# Patient Record
Sex: FEMALE | Race: WHITE | NOT HISPANIC OR LATINO
[De-identification: names, ages, dates, MRNs, and addresses within clinical notes are randomized per-mention and may not be internally consistent; named-entity substitution may affect disease eponyms.]

---

## 2017-01-09 ENCOUNTER — APPOINTMENT (OUTPATIENT)
Dept: HUMAN REPRODUCTION | Facility: CLINIC | Age: 37
End: 2017-01-09

## 2017-01-09 ENCOUNTER — OTHER (OUTPATIENT)
Age: 37
End: 2017-01-09

## 2017-01-17 ENCOUNTER — RESULT REVIEW (OUTPATIENT)
Age: 37
End: 2017-01-17

## 2017-01-18 ENCOUNTER — APPOINTMENT (OUTPATIENT)
Dept: HUMAN REPRODUCTION | Facility: CLINIC | Age: 37
End: 2017-01-18

## 2017-02-01 ENCOUNTER — APPOINTMENT (OUTPATIENT)
Dept: HUMAN REPRODUCTION | Facility: CLINIC | Age: 37
End: 2017-02-01

## 2017-02-21 ENCOUNTER — APPOINTMENT (OUTPATIENT)
Dept: HUMAN REPRODUCTION | Facility: CLINIC | Age: 37
End: 2017-02-21

## 2017-02-27 ENCOUNTER — APPOINTMENT (OUTPATIENT)
Dept: HUMAN REPRODUCTION | Facility: CLINIC | Age: 37
End: 2017-02-27

## 2017-03-01 ENCOUNTER — APPOINTMENT (OUTPATIENT)
Dept: HUMAN REPRODUCTION | Facility: CLINIC | Age: 37
End: 2017-03-01

## 2017-03-03 ENCOUNTER — APPOINTMENT (OUTPATIENT)
Dept: HUMAN REPRODUCTION | Facility: CLINIC | Age: 37
End: 2017-03-03

## 2017-03-05 ENCOUNTER — APPOINTMENT (OUTPATIENT)
Dept: HUMAN REPRODUCTION | Facility: CLINIC | Age: 37
End: 2017-03-05

## 2017-03-07 ENCOUNTER — APPOINTMENT (OUTPATIENT)
Dept: HUMAN REPRODUCTION | Facility: CLINIC | Age: 37
End: 2017-03-07

## 2017-03-08 ENCOUNTER — APPOINTMENT (OUTPATIENT)
Dept: HUMAN REPRODUCTION | Facility: CLINIC | Age: 37
End: 2017-03-08

## 2017-03-09 ENCOUNTER — APPOINTMENT (OUTPATIENT)
Dept: HUMAN REPRODUCTION | Facility: CLINIC | Age: 37
End: 2017-03-09

## 2017-03-10 ENCOUNTER — APPOINTMENT (OUTPATIENT)
Dept: HUMAN REPRODUCTION | Facility: CLINIC | Age: 37
End: 2017-03-10

## 2017-03-11 ENCOUNTER — APPOINTMENT (OUTPATIENT)
Dept: HUMAN REPRODUCTION | Facility: CLINIC | Age: 37
End: 2017-03-11

## 2017-03-14 ENCOUNTER — APPOINTMENT (OUTPATIENT)
Dept: HUMAN REPRODUCTION | Facility: CLINIC | Age: 37
End: 2017-03-14

## 2017-03-16 ENCOUNTER — APPOINTMENT (OUTPATIENT)
Dept: HUMAN REPRODUCTION | Facility: CLINIC | Age: 37
End: 2017-03-16

## 2017-04-03 ENCOUNTER — APPOINTMENT (OUTPATIENT)
Dept: HUMAN REPRODUCTION | Facility: CLINIC | Age: 37
End: 2017-04-03

## 2017-04-05 ENCOUNTER — RX RENEWAL (OUTPATIENT)
Age: 37
End: 2017-04-05

## 2017-04-10 ENCOUNTER — RX RENEWAL (OUTPATIENT)
Age: 37
End: 2017-04-10

## 2017-04-20 ENCOUNTER — APPOINTMENT (OUTPATIENT)
Dept: HUMAN REPRODUCTION | Facility: CLINIC | Age: 37
End: 2017-04-20

## 2017-04-20 VITALS
BODY MASS INDEX: 22.66 KG/M2 | HEART RATE: 85 BPM | HEIGHT: 66 IN | TEMPERATURE: 98 F | OXYGEN SATURATION: 100 % | WEIGHT: 141 LBS | RESPIRATION RATE: 17 BRPM | DIASTOLIC BLOOD PRESSURE: 65 MMHG | SYSTOLIC BLOOD PRESSURE: 115 MMHG

## 2017-04-28 ENCOUNTER — APPOINTMENT (OUTPATIENT)
Dept: HUMAN REPRODUCTION | Facility: CLINIC | Age: 37
End: 2017-04-28

## 2017-05-02 ENCOUNTER — OUTPATIENT (OUTPATIENT)
Dept: OUTPATIENT SERVICES | Facility: HOSPITAL | Age: 37
LOS: 1 days | End: 2017-05-02
Payer: COMMERCIAL

## 2017-05-02 ENCOUNTER — APPOINTMENT (OUTPATIENT)
Dept: HUMAN REPRODUCTION | Facility: HOSPITAL | Age: 37
End: 2017-05-02

## 2017-05-02 ENCOUNTER — RESULT REVIEW (OUTPATIENT)
Age: 37
End: 2017-05-02

## 2017-05-02 ENCOUNTER — TRANSCRIPTION ENCOUNTER (OUTPATIENT)
Age: 37
End: 2017-05-02

## 2017-05-02 VITALS
OXYGEN SATURATION: 100 % | DIASTOLIC BLOOD PRESSURE: 61 MMHG | RESPIRATION RATE: 16 BRPM | TEMPERATURE: 98 F | HEART RATE: 81 BPM | SYSTOLIC BLOOD PRESSURE: 101 MMHG

## 2017-05-02 VITALS
HEART RATE: 85 BPM | WEIGHT: 141.1 LBS | SYSTOLIC BLOOD PRESSURE: 108 MMHG | HEIGHT: 66 IN | TEMPERATURE: 98 F | DIASTOLIC BLOOD PRESSURE: 73 MMHG | OXYGEN SATURATION: 100 %

## 2017-05-02 DIAGNOSIS — O02.1 MISSED ABORTION: ICD-10-CM

## 2017-05-02 DIAGNOSIS — O02.1 MISSED ABORTION: Chronic | ICD-10-CM

## 2017-05-02 LAB
BLD GP AB SCN SERPL QL: POSITIVE — SIGNIFICANT CHANGE UP
HCT VFR BLD CALC: 34.4 % — LOW (ref 34.5–45)
HGB BLD-MCNC: 11.7 G/DL — SIGNIFICANT CHANGE UP (ref 11.5–15.5)
MCHC RBC-ENTMCNC: 29.5 PG — SIGNIFICANT CHANGE UP (ref 27–34)
MCHC RBC-ENTMCNC: 34.1 GM/DL — SIGNIFICANT CHANGE UP (ref 32–36)
MCV RBC AUTO: 86.5 FL — SIGNIFICANT CHANGE UP (ref 80–100)
PLATELET # BLD AUTO: 191 K/UL — SIGNIFICANT CHANGE UP (ref 150–400)
RBC # BLD: 3.98 M/UL — SIGNIFICANT CHANGE UP (ref 3.8–5.2)
RBC # FLD: 11.3 % — SIGNIFICANT CHANGE UP (ref 10.3–14.5)
RH IG SCN BLD-IMP: POSITIVE — SIGNIFICANT CHANGE UP
WBC # BLD: 5.8 K/UL — SIGNIFICANT CHANGE UP (ref 3.8–10.5)
WBC # FLD AUTO: 5.8 K/UL — SIGNIFICANT CHANGE UP (ref 3.8–10.5)

## 2017-05-02 PROCEDURE — 59820 CARE OF MISCARRIAGE: CPT

## 2017-05-02 PROCEDURE — 88280 CHROMOSOME KARYOTYPE STUDY: CPT

## 2017-05-02 PROCEDURE — 88305 TISSUE EXAM BY PATHOLOGIST: CPT | Mod: 26

## 2017-05-02 PROCEDURE — 86870 RBC ANTIBODY IDENTIFICATION: CPT

## 2017-05-02 PROCEDURE — 86880 COOMBS TEST DIRECT: CPT

## 2017-05-02 PROCEDURE — 88264 CHROMOSOME ANALYSIS 20-25: CPT

## 2017-05-02 PROCEDURE — 86900 BLOOD TYPING SEROLOGIC ABO: CPT

## 2017-05-02 PROCEDURE — 88291 CYTO/MOLECULAR REPORT: CPT

## 2017-05-02 PROCEDURE — 86850 RBC ANTIBODY SCREEN: CPT

## 2017-05-02 PROCEDURE — 86901 BLOOD TYPING SEROLOGIC RH(D): CPT

## 2017-05-02 PROCEDURE — 86077 PHYS BLOOD BANK SERV XMATCH: CPT

## 2017-05-02 PROCEDURE — 88233 TISSUE CULTURE SKIN/BIOPSY: CPT

## 2017-05-02 PROCEDURE — 88305 TISSUE EXAM BY PATHOLOGIST: CPT

## 2017-05-02 PROCEDURE — 85027 COMPLETE CBC AUTOMATED: CPT

## 2017-05-02 PROCEDURE — 86922 COMPATIBILITY TEST ANTIGLOB: CPT

## 2017-05-02 RX ORDER — ACETAMINOPHEN 500 MG
975 TABLET ORAL ONCE
Qty: 0 | Refills: 0 | Status: COMPLETED | OUTPATIENT
Start: 2017-05-02 | End: 2017-05-02

## 2017-05-02 RX ORDER — SODIUM CHLORIDE 9 MG/ML
1000 INJECTION INTRAMUSCULAR; INTRAVENOUS; SUBCUTANEOUS
Qty: 0 | Refills: 0 | Status: DISCONTINUED | OUTPATIENT
Start: 2017-05-02 | End: 2017-05-17

## 2017-05-02 RX ORDER — CELECOXIB 200 MG/1
200 CAPSULE ORAL ONCE
Qty: 0 | Refills: 0 | Status: DISCONTINUED | OUTPATIENT
Start: 2017-05-02 | End: 2017-05-17

## 2017-05-02 RX ORDER — LIDOCAINE HCL 20 MG/ML
0.2 VIAL (ML) INJECTION ONCE
Qty: 0 | Refills: 0 | Status: DISCONTINUED | OUTPATIENT
Start: 2017-05-02 | End: 2017-05-17

## 2017-05-02 RX ORDER — OXYCODONE HYDROCHLORIDE 5 MG/1
5 TABLET ORAL ONCE
Qty: 0 | Refills: 0 | Status: DISCONTINUED | OUTPATIENT
Start: 2017-05-02 | End: 2017-05-02

## 2017-05-02 RX ORDER — SODIUM CHLORIDE 9 MG/ML
1000 INJECTION, SOLUTION INTRAVENOUS
Qty: 0 | Refills: 0 | Status: DISCONTINUED | OUTPATIENT
Start: 2017-05-02 | End: 2017-05-17

## 2017-05-02 RX ORDER — ONDANSETRON 8 MG/1
4 TABLET, FILM COATED ORAL ONCE
Qty: 0 | Refills: 0 | Status: DISCONTINUED | OUTPATIENT
Start: 2017-05-02 | End: 2017-05-17

## 2017-05-02 RX ORDER — CELECOXIB 200 MG/1
200 CAPSULE ORAL ONCE
Qty: 0 | Refills: 0 | Status: COMPLETED | OUTPATIENT
Start: 2017-05-02 | End: 2017-05-02

## 2017-05-02 RX ORDER — SODIUM CHLORIDE 9 MG/ML
3 INJECTION INTRAMUSCULAR; INTRAVENOUS; SUBCUTANEOUS EVERY 8 HOURS
Qty: 0 | Refills: 0 | Status: DISCONTINUED | OUTPATIENT
Start: 2017-05-02 | End: 2017-05-17

## 2017-05-02 RX ADMIN — CELECOXIB 200 MILLIGRAM(S): 200 CAPSULE ORAL at 14:14

## 2017-05-02 RX ADMIN — Medication 975 MILLIGRAM(S): at 14:14

## 2017-05-02 NOTE — H&P PST ADULT - NSANTHOSAYNRD_GEN_A_CORE
No. NIMCO screening performed.  STOP BANG Legend: 0-2 = LOW Risk; 3-4 = INTERMEDIATE Risk; 5-8 = HIGH Risk

## 2017-05-02 NOTE — ASU DISCHARGE PLAN (ADULT/PEDIATRIC). - NOTIFY
GYN Fever>100.4/Bleeding that does not stop/Persistent Nausea and Vomiting Pain not relieved by Medications/Persistent Nausea and Vomiting/GYN Fever>100.4/Unable to Urinate/Inability to Tolerate Liquids or Foods/Bleeding that does not stop

## 2017-05-02 NOTE — ASU DISCHARGE PLAN (ADULT/PEDIATRIC). - MEDICATION SUMMARY - MEDICATIONS TO TAKE
I will START or STAY ON the medications listed below when I get home from the hospital:    Synthroid 175 mcg (0.175 mg) oral tablet  -- 1 tab(s) by mouth once a day  -- Indication: For home

## 2017-05-02 NOTE — H&P PST ADULT - PROBLEM SELECTOR PLAN 1
Suction curettage for missed Ab  Pt. given Pepcid at PST with sip of water  Pt. last ate at 9 pm on 5/1/17.

## 2017-05-02 NOTE — H&P PST ADULT - HISTORY OF PRESENT ILLNESS
Pt. is a 37 y.o.  WF  with LMP 3/1/17  Pt. states routine sono revealed no fetal heartbeat. She denies pain and bleeding. She is scheduled for suction curettage for missed AB.

## 2017-05-04 LAB — SURGICAL PATHOLOGY STUDY: SIGNIFICANT CHANGE UP

## 2017-05-10 ENCOUNTER — APPOINTMENT (OUTPATIENT)
Dept: HUMAN REPRODUCTION | Facility: CLINIC | Age: 37
End: 2017-05-10

## 2017-05-24 ENCOUNTER — APPOINTMENT (OUTPATIENT)
Dept: HUMAN REPRODUCTION | Facility: CLINIC | Age: 37
End: 2017-05-24

## 2017-05-25 LAB — CHROM ANALY OVERALL INTERP SPEC-IMP: SIGNIFICANT CHANGE UP

## 2017-06-30 ENCOUNTER — APPOINTMENT (OUTPATIENT)
Dept: ENDOCRINOLOGY | Facility: CLINIC | Age: 37
End: 2017-06-30

## 2017-06-30 VITALS
HEART RATE: 79 BPM | SYSTOLIC BLOOD PRESSURE: 110 MMHG | BODY MASS INDEX: 21.69 KG/M2 | DIASTOLIC BLOOD PRESSURE: 60 MMHG | OXYGEN SATURATION: 99 % | WEIGHT: 135 LBS | HEIGHT: 66 IN

## 2017-07-05 ENCOUNTER — RX RENEWAL (OUTPATIENT)
Age: 37
End: 2017-07-05

## 2017-07-05 LAB
T3FREE SERPL-MCNC: 3.15 PG/ML
T4 FREE SERPL-MCNC: 2 NG/DL
TSH SERPL-ACNC: 0.04 UIU/ML

## 2017-07-24 ENCOUNTER — APPOINTMENT (OUTPATIENT)
Dept: HUMAN REPRODUCTION | Facility: CLINIC | Age: 37
End: 2017-07-24

## 2017-09-06 ENCOUNTER — OUTPATIENT (OUTPATIENT)
Dept: OUTPATIENT SERVICES | Facility: HOSPITAL | Age: 37
LOS: 1 days | End: 2017-09-06
Payer: COMMERCIAL

## 2017-09-06 VITALS
SYSTOLIC BLOOD PRESSURE: 97 MMHG | HEART RATE: 74 BPM | WEIGHT: 138.89 LBS | OXYGEN SATURATION: 100 % | RESPIRATION RATE: 14 BRPM | TEMPERATURE: 98 F | DIASTOLIC BLOOD PRESSURE: 66 MMHG | HEIGHT: 66 IN

## 2017-09-06 DIAGNOSIS — N70.11 CHRONIC SALPINGITIS: ICD-10-CM

## 2017-09-06 DIAGNOSIS — Z01.818 ENCOUNTER FOR OTHER PREPROCEDURAL EXAMINATION: ICD-10-CM

## 2017-09-06 DIAGNOSIS — N70.91 SALPINGITIS, UNSPECIFIED: ICD-10-CM

## 2017-09-06 DIAGNOSIS — O02.1 MISSED ABORTION: Chronic | ICD-10-CM

## 2017-09-06 LAB
HCT VFR BLD CALC: 36.4 % — SIGNIFICANT CHANGE UP (ref 34.5–45)
HGB BLD-MCNC: 12.5 G/DL — SIGNIFICANT CHANGE UP (ref 11.5–15.5)
MCHC RBC-ENTMCNC: 29 PG — SIGNIFICANT CHANGE UP (ref 27–34)
MCHC RBC-ENTMCNC: 34.3 GM/DL — SIGNIFICANT CHANGE UP (ref 32–36)
MCV RBC AUTO: 84.5 FL — SIGNIFICANT CHANGE UP (ref 80–100)
PLATELET # BLD AUTO: 197 K/UL — SIGNIFICANT CHANGE UP (ref 150–400)
RBC # BLD: 4.31 M/UL — SIGNIFICANT CHANGE UP (ref 3.8–5.2)
RBC # FLD: 13.7 % — SIGNIFICANT CHANGE UP (ref 10.3–14.5)
WBC # BLD: 5.9 K/UL — SIGNIFICANT CHANGE UP (ref 3.8–10.5)
WBC # FLD AUTO: 5.9 K/UL — SIGNIFICANT CHANGE UP (ref 3.8–10.5)

## 2017-09-06 PROCEDURE — G0463: CPT

## 2017-09-06 PROCEDURE — 85027 COMPLETE CBC AUTOMATED: CPT

## 2017-09-06 RX ORDER — LIDOCAINE HCL 20 MG/ML
0.2 VIAL (ML) INJECTION ONCE
Qty: 0 | Refills: 0 | Status: DISCONTINUED | OUTPATIENT
Start: 2017-09-12 | End: 2017-09-27

## 2017-09-06 RX ORDER — ACETAMINOPHEN 500 MG
975 TABLET ORAL ONCE
Qty: 0 | Refills: 0 | Status: COMPLETED | OUTPATIENT
Start: 2017-09-12 | End: 2017-09-12

## 2017-09-06 RX ORDER — SODIUM CHLORIDE 9 MG/ML
3 INJECTION INTRAMUSCULAR; INTRAVENOUS; SUBCUTANEOUS EVERY 8 HOURS
Qty: 0 | Refills: 0 | Status: DISCONTINUED | OUTPATIENT
Start: 2017-09-12 | End: 2017-09-27

## 2017-09-06 NOTE — H&P PST ADULT - PMH
Hypothyroidism    Missed  Female infertility    Hypothyroidism    Missed   x 2  Salpingitis  chronic

## 2017-09-06 NOTE — H&P PST ADULT - NSANTHOSAYNRD_GEN_A_CORE
neck = 12.5 inches/No. NIMCO screening performed.  STOP BANG Legend: 0-2 = LOW Risk; 3-4 = INTERMEDIATE Risk; 5-8 = HIGH Risk

## 2017-09-06 NOTE — H&P PST ADULT - HISTORY OF PRESENT ILLNESS
38 yo female with history of hypothyroidism and chronic salpingitis presents for hysteroscopy and possible right salpingectomy.    Surgical procedure type confirmed with Jeny from Dr Mondragon's booking department ([phone]) as hysteroscopy and possible right salpingectomy. 36 yo female with history of 3 miscarriages after 2 full term pregnancies.

## 2017-09-12 ENCOUNTER — OUTPATIENT (OUTPATIENT)
Dept: OUTPATIENT SERVICES | Facility: HOSPITAL | Age: 37
LOS: 1 days | End: 2017-09-12
Payer: COMMERCIAL

## 2017-09-12 ENCOUNTER — APPOINTMENT (OUTPATIENT)
Dept: HUMAN REPRODUCTION | Facility: HOSPITAL | Age: 37
End: 2017-09-12
Payer: COMMERCIAL

## 2017-09-12 ENCOUNTER — APPOINTMENT (OUTPATIENT)
Dept: HUMAN REPRODUCTION | Facility: HOSPITAL | Age: 37
End: 2017-09-12

## 2017-09-12 ENCOUNTER — RESULT REVIEW (OUTPATIENT)
Age: 37
End: 2017-09-12

## 2017-09-12 VITALS
WEIGHT: 138.89 LBS | HEIGHT: 66 IN | TEMPERATURE: 98 F | HEART RATE: 74 BPM | SYSTOLIC BLOOD PRESSURE: 97 MMHG | OXYGEN SATURATION: 100 % | DIASTOLIC BLOOD PRESSURE: 66 MMHG | RESPIRATION RATE: 14 BRPM

## 2017-09-12 VITALS
TEMPERATURE: 98 F | DIASTOLIC BLOOD PRESSURE: 70 MMHG | RESPIRATION RATE: 18 BRPM | HEART RATE: 74 BPM | SYSTOLIC BLOOD PRESSURE: 100 MMHG | OXYGEN SATURATION: 100 %

## 2017-09-12 DIAGNOSIS — O02.1 MISSED ABORTION: Chronic | ICD-10-CM

## 2017-09-12 DIAGNOSIS — N70.11 CHRONIC SALPINGITIS: ICD-10-CM

## 2017-09-12 PROCEDURE — 88305 TISSUE EXAM BY PATHOLOGIST: CPT

## 2017-09-12 PROCEDURE — 58559 HYSTEROSCOPY LYSIS: CPT

## 2017-09-12 PROCEDURE — 88305 TISSUE EXAM BY PATHOLOGIST: CPT | Mod: 26

## 2017-09-12 RX ORDER — FAMOTIDINE 10 MG/ML
1 INJECTION INTRAVENOUS
Qty: 0 | Refills: 0 | COMMUNITY

## 2017-09-12 RX ORDER — CELECOXIB 200 MG/1
200 CAPSULE ORAL ONCE
Qty: 0 | Refills: 0 | Status: COMPLETED | OUTPATIENT
Start: 2017-09-12 | End: 2017-09-12

## 2017-09-12 RX ORDER — ONDANSETRON 8 MG/1
4 TABLET, FILM COATED ORAL ONCE
Qty: 0 | Refills: 0 | Status: DISCONTINUED | OUTPATIENT
Start: 2017-09-12 | End: 2017-09-27

## 2017-09-12 RX ORDER — LEVOTHYROXINE SODIUM 125 MCG
1 TABLET ORAL
Qty: 0 | Refills: 0 | COMMUNITY

## 2017-09-12 RX ORDER — SODIUM CHLORIDE 9 MG/ML
1000 INJECTION, SOLUTION INTRAVENOUS
Qty: 0 | Refills: 0 | Status: DISCONTINUED | OUTPATIENT
Start: 2017-09-12 | End: 2017-09-27

## 2017-09-12 RX ORDER — CELECOXIB 200 MG/1
200 CAPSULE ORAL ONCE
Qty: 0 | Refills: 0 | Status: DISCONTINUED | OUTPATIENT
Start: 2017-09-12 | End: 2017-09-27

## 2017-09-12 RX ADMIN — Medication 975 MILLIGRAM(S): at 14:05

## 2017-09-12 RX ADMIN — CELECOXIB 200 MILLIGRAM(S): 200 CAPSULE ORAL at 14:06

## 2017-09-12 NOTE — ASU DISCHARGE PLAN (ADULT/PEDIATRIC). - NOTIFY
Bleeding that does not stop/GYN Fever>100.4 GYN Fever>100.4/Inability to Tolerate Liquids or Foods/Swelling that continues/Bleeding that does not stop/Persistent Nausea and Vomiting/Unable to Urinate/Pain not relieved by Medications

## 2017-09-12 NOTE — BRIEF OPERATIVE NOTE - OPERATION/FINDINGS
Significant adhesions of fundus from ostia to ostia, incised laterally in rows. Saline distention, scissors, no cautery. Small sample of EMC to pathology. Saline uptake c. 250 cc.

## 2017-09-13 LAB — SURGICAL PATHOLOGY STUDY: SIGNIFICANT CHANGE UP

## 2017-09-20 ENCOUNTER — APPOINTMENT (OUTPATIENT)
Dept: HUMAN REPRODUCTION | Facility: CLINIC | Age: 37
End: 2017-09-20
Payer: COMMERCIAL

## 2017-09-20 PROCEDURE — 99024 POSTOP FOLLOW-UP VISIT: CPT

## 2017-09-27 ENCOUNTER — APPOINTMENT (OUTPATIENT)
Dept: HUMAN REPRODUCTION | Facility: CLINIC | Age: 37
End: 2017-09-27
Payer: COMMERCIAL

## 2017-09-27 PROCEDURE — 76830 TRANSVAGINAL US NON-OB: CPT

## 2017-09-27 PROCEDURE — 99024 POSTOP FOLLOW-UP VISIT: CPT

## 2017-09-28 ENCOUNTER — TRANSCRIPTION ENCOUNTER (OUTPATIENT)
Age: 37
End: 2017-09-28

## 2017-12-09 ENCOUNTER — APPOINTMENT (OUTPATIENT)
Dept: HUMAN REPRODUCTION | Facility: CLINIC | Age: 37
End: 2017-12-09
Payer: COMMERCIAL

## 2017-12-09 PROCEDURE — 36415 COLL VENOUS BLD VENIPUNCTURE: CPT

## 2017-12-18 ENCOUNTER — APPOINTMENT (OUTPATIENT)
Dept: HUMAN REPRODUCTION | Facility: CLINIC | Age: 37
End: 2017-12-18
Payer: COMMERCIAL

## 2017-12-18 PROCEDURE — 76817 TRANSVAGINAL US OBSTETRIC: CPT

## 2017-12-18 PROCEDURE — 99213 OFFICE O/P EST LOW 20 MIN: CPT | Mod: 25

## 2017-12-18 PROCEDURE — 36415 COLL VENOUS BLD VENIPUNCTURE: CPT

## 2017-12-22 ENCOUNTER — APPOINTMENT (OUTPATIENT)
Dept: HUMAN REPRODUCTION | Facility: CLINIC | Age: 37
End: 2017-12-22
Payer: COMMERCIAL

## 2017-12-22 PROCEDURE — 99213 OFFICE O/P EST LOW 20 MIN: CPT | Mod: 25

## 2017-12-22 PROCEDURE — 76817 TRANSVAGINAL US OBSTETRIC: CPT

## 2017-12-22 PROCEDURE — 36415 COLL VENOUS BLD VENIPUNCTURE: CPT

## 2017-12-27 ENCOUNTER — APPOINTMENT (OUTPATIENT)
Dept: ENDOCRINOLOGY | Facility: CLINIC | Age: 37
End: 2017-12-27
Payer: COMMERCIAL

## 2017-12-27 VITALS
HEIGHT: 66 IN | BODY MASS INDEX: 22.5 KG/M2 | OXYGEN SATURATION: 98 % | SYSTOLIC BLOOD PRESSURE: 110 MMHG | HEART RATE: 68 BPM | WEIGHT: 140 LBS | DIASTOLIC BLOOD PRESSURE: 64 MMHG

## 2017-12-27 PROCEDURE — 99213 OFFICE O/P EST LOW 20 MIN: CPT

## 2018-01-03 ENCOUNTER — APPOINTMENT (OUTPATIENT)
Dept: HUMAN REPRODUCTION | Facility: CLINIC | Age: 38
End: 2018-01-03
Payer: COMMERCIAL

## 2018-01-03 PROCEDURE — 99213 OFFICE O/P EST LOW 20 MIN: CPT | Mod: 25

## 2018-01-03 PROCEDURE — 76817 TRANSVAGINAL US OBSTETRIC: CPT

## 2018-01-08 ENCOUNTER — APPOINTMENT (OUTPATIENT)
Dept: HUMAN REPRODUCTION | Facility: CLINIC | Age: 38
End: 2018-01-08
Payer: COMMERCIAL

## 2018-01-08 PROCEDURE — 76817 TRANSVAGINAL US OBSTETRIC: CPT

## 2018-01-08 PROCEDURE — 99213 OFFICE O/P EST LOW 20 MIN: CPT | Mod: 25

## 2018-01-12 ENCOUNTER — APPOINTMENT (OUTPATIENT)
Dept: HUMAN REPRODUCTION | Facility: CLINIC | Age: 38
End: 2018-01-12
Payer: COMMERCIAL

## 2018-01-12 PROCEDURE — 99213 OFFICE O/P EST LOW 20 MIN: CPT | Mod: 25

## 2018-01-12 PROCEDURE — 76830 TRANSVAGINAL US NON-OB: CPT

## 2018-02-09 ENCOUNTER — APPOINTMENT (OUTPATIENT)
Dept: HUMAN REPRODUCTION | Facility: CLINIC | Age: 38
End: 2018-02-09
Payer: COMMERCIAL

## 2018-02-09 PROCEDURE — 99214 OFFICE O/P EST MOD 30 MIN: CPT

## 2018-03-09 ENCOUNTER — APPOINTMENT (OUTPATIENT)
Dept: ENDOCRINOLOGY | Facility: CLINIC | Age: 38
End: 2018-03-09

## 2018-07-02 ENCOUNTER — RX RENEWAL (OUTPATIENT)
Age: 38
End: 2018-07-02

## 2018-07-16 PROBLEM — O02.1 MISSED ABORTION: Chronic | Status: ACTIVE | Noted: 2017-05-02

## 2018-07-23 PROBLEM — N97.9 FEMALE INFERTILITY, UNSPECIFIED: Chronic | Status: ACTIVE | Noted: 2017-09-06

## 2018-07-23 PROBLEM — N70.91 SALPINGITIS, UNSPECIFIED: Chronic | Status: ACTIVE | Noted: 2017-09-06

## 2018-07-25 ENCOUNTER — APPOINTMENT (OUTPATIENT)
Dept: HUMAN REPRODUCTION | Facility: CLINIC | Age: 38
End: 2018-07-25
Payer: COMMERCIAL

## 2018-07-25 PROCEDURE — 76817 TRANSVAGINAL US OBSTETRIC: CPT

## 2018-07-25 PROCEDURE — 99213 OFFICE O/P EST LOW 20 MIN: CPT | Mod: 25

## 2018-08-02 ENCOUNTER — APPOINTMENT (OUTPATIENT)
Dept: HUMAN REPRODUCTION | Facility: CLINIC | Age: 38
End: 2018-08-02
Payer: COMMERCIAL

## 2018-08-02 PROCEDURE — 76817 TRANSVAGINAL US OBSTETRIC: CPT

## 2018-08-02 PROCEDURE — 99213 OFFICE O/P EST LOW 20 MIN: CPT | Mod: 25

## 2018-08-02 PROCEDURE — 36415 COLL VENOUS BLD VENIPUNCTURE: CPT

## 2018-08-08 ENCOUNTER — APPOINTMENT (OUTPATIENT)
Dept: HUMAN REPRODUCTION | Facility: CLINIC | Age: 38
End: 2018-08-08
Payer: COMMERCIAL

## 2018-08-08 PROCEDURE — 99213 OFFICE O/P EST LOW 20 MIN: CPT | Mod: 25

## 2018-08-08 PROCEDURE — 36415 COLL VENOUS BLD VENIPUNCTURE: CPT

## 2018-08-08 PROCEDURE — 76817 TRANSVAGINAL US OBSTETRIC: CPT

## 2019-08-22 ENCOUNTER — RX RENEWAL (OUTPATIENT)
Age: 39
End: 2019-08-22

## 2019-12-04 ENCOUNTER — RESULT REVIEW (OUTPATIENT)
Age: 39
End: 2019-12-04

## 2020-03-28 ENCOUNTER — EMERGENCY (EMERGENCY)
Facility: HOSPITAL | Age: 40
LOS: 1 days | Discharge: HOME | End: 2020-03-28
Attending: EMERGENCY MEDICINE
Payer: COMMERCIAL

## 2020-03-28 VITALS
TEMPERATURE: 101 F | HEART RATE: 104 BPM | OXYGEN SATURATION: 100 % | SYSTOLIC BLOOD PRESSURE: 116 MMHG | DIASTOLIC BLOOD PRESSURE: 70 MMHG | RESPIRATION RATE: 18 BRPM

## 2020-03-28 VITALS
WEIGHT: 134.92 LBS | HEART RATE: 110 BPM | HEIGHT: 66 IN | OXYGEN SATURATION: 100 % | TEMPERATURE: 99 F | SYSTOLIC BLOOD PRESSURE: 122 MMHG | DIASTOLIC BLOOD PRESSURE: 66 MMHG | RESPIRATION RATE: 18 BRPM

## 2020-03-28 DIAGNOSIS — O02.1 MISSED ABORTION: Chronic | ICD-10-CM

## 2020-03-28 PROCEDURE — 99283 EMERGENCY DEPT VISIT LOW MDM: CPT

## 2020-03-28 PROCEDURE — 99284 EMERGENCY DEPT VISIT MOD MDM: CPT

## 2020-03-28 PROCEDURE — 87635 SARS-COV-2 COVID-19 AMP PRB: CPT

## 2020-03-28 RX ORDER — ACETAMINOPHEN 500 MG
325 TABLET ORAL ONCE
Refills: 0 | Status: COMPLETED | OUTPATIENT
Start: 2020-03-28 | End: 2020-03-28

## 2020-03-28 RX ADMIN — Medication 325 MILLIGRAM(S): at 16:38

## 2020-03-28 NOTE — ED PROVIDER NOTE - CARE PLAN
Principal Discharge DX:	Fever  Secondary Diagnosis:	[facility]

## 2020-03-28 NOTE — ED PROVIDER NOTE - NSFOLLOWUPINSTRUCTIONS_ED_ALL_ED_FT
You were seen and evaluated for possible COVID 19 infection. Testing was performed. Please follow up with employee health for results and recommendations. We think you are safe for discharge and to follow up in a few days with your doctor by phone or in person. If you have high blood pressure or diabetes talk to your doctor about the use of ACE inhibitors while with suspected coronavirus.    You should treat fevers by taking Tylenol as needed. You can take 1gram every 8 hours for fever/aches.     You should stay hydrated and increase the amount of fluid you drink.    Read all discharge paperwork.    Return to the Emergency Department if your shortness of breath becomes worse, you become weak, fevers, confusion, or if new symptoms develop.    You should monitor your temperature and quarantine yourself away from others - particularly the elderly, ill, or immunosuppressed - for the next 14 days, please follow up with employee health and your manager for the most up to date information.        - Do not go to work, school, or public areas. Avoid using public transportation, ride-sharing, or taxis.  -Wear a mask whenever you are around other people.  -Avoid sharing personal household items. You should not share dishes, drinking glasses, cups, eating utensils, towels, or bedding with other people or pets in your home. After using these items, they should be washed thoroughly with soap and water.   - Avoid touching your eyes, nose, and mouth with your hands.  - Wash your hands often with soap and water for at least 15 to 20 seconds or clean your hands with an alcohol-based hand  that contains 60 to 95% alcohol, covering all surfaces of your hands and rubbing them together until they feel dry. Soap and water should be used preferentially if hands are visibly dirty.

## 2020-03-28 NOTE — ED PROVIDER NOTE - OBJECTIVE STATEMENT
39 y/o F with PMH Hypothyroidism presents to ED with URI symptoms for the last few days. Endorses infrequent dry cough, body aches, nausea, vomiting, fevers, SOB on exertion, chest tightness.   Tmax 99.9, last dosage of Tylenol 650mg at 2pm.   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 Family history of late onset diabetes. Denies diarrhea, urinary symptoms, recent travel, smoking history. Patient has not been tested for COVID-19 39 y/o F with PMH Hypothyroidism presents to ED with URI symptoms for the last few days. Endorses infrequent dry cough, body aches, nausea, vomiting, fevers, SOB on exertion, burning chest tightness.   Tmax 99.9, last dosage of Tylenol 650mg at 2pm.   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 Family history of late onset diabetes. Denies diarrhea, urinary symptoms, recent travel, smoking history. Patient has not been tested for COVID-19

## 2020-03-28 NOTE — ED PROVIDER NOTE - CLINICAL SUMMARY MEDICAL DECISION MAKING FREE TEXT BOX
The patient does not have high-risk features of a life-threatening URI infection (COVID or otherwise). There is no severe shortness of breath, light-headedness, or inability to perform activities of daily living that would indicate impending respiratory failure. I have had a long conversation with the patient regarding the reasons to return to the Emergency Department including but not limited to: worsening cough, shortness of breath, syncope, near-syncope, chest pain or any other concerns.  However, COVID/RVP swabs were sent given age and multiple risk factors, as well as high risk exposure to clusters or possible COVID-19 exposures.  Patient was counseled extensively on self-quarantine protocol, hand washing, covering cough, cleansing of regularly used surfaces, return precautions, and supportive care measures to be taken.

## 2020-03-28 NOTE — ED PROVIDER NOTE - PHYSICAL EXAMINATION
GEN: Well Appearing, Nontoxic, NAD  HEENT: NC/AT, Symm Facies.   CV: +S1S2, RRR w/o m/g/r  RESP: CTAB w/o w/r/r  ABD: Soft, nt/nd  EXT/MSK: No lower extremity edema or calf tenderness.  FROMx4  Neuro: Grossly intact, AOX3 with normal speech

## 2020-03-28 NOTE — ED PROVIDER NOTE - PROGRESS NOTE DETAILS
Patient swabbed for COVID 19. Patient to follow up with EHS.  ED attending to be dc home. O2 sat 100% on room air.  - Lesley Carreno PA-C This patient was seen during the time of the COVID-19 pandemic. The patient was stable for discharge and the team advised the patient that admission for further work up poses increased risks to health that do not necessarily outweigh the possible benefits of further inpatient treatment. Patient given strict return precautions including but not limited to: persistent or worsening shortness of breath, chest pain, shortness of breath at rest, shortness of breath worsening on exertion, uncontrollable fevers, weakness, inability to eat or drink, or any concerns at all.  The patient and/or family were given the opportunity to ask questions and have them answered in full. The patient and/or family are with capacity and insight into the situation, treatment, risks, benefits, alternative therapies, and understand that they can ask any further questions if needed.

## 2020-03-28 NOTE — ED PROVIDER NOTE - ATTENDING CONTRIBUTION TO CARE
Agree with above, Gume Menard MD, FACEP  The patient was serially evaluated throughout emergency department course. There was no acute deterioration up to this time in the department. Patient has demonstrated clinical improvement and is stable, feels better at this time according to emergency department team. Agree with goals/plan of emergency department care as described in this physician's electronic medical record, including diagnostics, therapeutics and consultation as clinically warranted. Will discharge home with close outpatient follow up with primary care physician/provider and specialist if necessary. The patient and/or family was educated on concerning signs and features to return to the emergency department, in layman terms, including but not limited to: nausea, vomiting, fever, chills, persistent/worsening symptoms or any concerns at all. No immediate life threatening issues present on history, clinical exam, or any diagnostic evaluation. The patient is a safe disposition home, has capacity and insight into their condition, is ambulatory in the Emergency Department with no further questions and will follow up with their doctor(s) this week. The patient and/or family were given the opportunity to ask questions and have them answered in full. The patient and/or family are with capacity and insight into the situation, treatment, risks, benefits, alternative therapies, and understand that they can ask any further questions if needed. Patient and/or family/guardian understands anticipatory guidance and was given strict return and follow up precautions. The patient and/or family/guardian has been informed, in layman terms, of all concerning signs and symptoms to return to Emergency Department, the necessity to follow up with the PMD/Clinic/follow up provided within 2-3 days was explained, and the patient and/or family/guardian reports understanding of above with capacity and insight.

## 2020-03-28 NOTE — ED PROVIDER NOTE - NS ED ROS FT
Constitutional: + fever + chills  Eyes: No visual changes  CV: +chest tightness No lower extremity edema  Resp: +SOB + cough  GI: No abd pain. + nausea + vomiting.   : No dysuria, hematuria.   MSK: +body achiness   Skin: No rash  Psych: No complaints   Neuro: No numbness or tingling. + weakness.

## 2020-03-28 NOTE — ED PROVIDER NOTE - PATIENT PORTAL LINK FT
You can access the FollowMyHealth Patient Portal offered by [facility] by registering at the following website: [email or web address]. By joining [employer]’s FollowMyHealth portal, you will also be able to view your health information using other applications (apps) compatible with our system.

## 2020-03-29 LAB — SARS-COV-2 RNA SPEC QL NAA+PROBE: DETECTED

## 2020-04-25 ENCOUNTER — MESSAGE (OUTPATIENT)
Age: 40
End: 2020-04-25

## 2020-05-05 ENCOUNTER — APPOINTMENT (OUTPATIENT)
Dept: DISASTER EMERGENCY | Facility: CLINIC | Age: 40
End: 2020-05-05

## 2020-05-07 LAB
SARS-COV-2 IGG SERPL IA-ACNC: 92.9 AU/ML
SARS-COV-2 IGG SERPL QL IA: POSITIVE

## 2020-05-08 ENCOUNTER — APPOINTMENT (OUTPATIENT)
Dept: ENDOCRINOLOGY | Facility: CLINIC | Age: 40
End: 2020-05-08
Payer: COMMERCIAL

## 2020-05-08 PROCEDURE — 99213 OFFICE O/P EST LOW 20 MIN: CPT | Mod: 95

## 2020-05-13 LAB
T4 FREE SERPL-MCNC: 1.2 NG/DL
TSH SERPL-ACNC: 4.2 UIU/ML

## 2020-05-26 NOTE — ASU PATIENT PROFILE, ADULT - NS PRO ABUSE SCREEN SUSPICION NEGLECT YN
FAMILY MEDICINE TELEPHONE OFFICE VISIT      Patient: Roscoe Cosme Date of Service:  20   : 1947 MRN: [id number]     Due to COVID-19 ACTION PLAN, the patient's office visit was converted to a phone visit.  This call was made to Roscoe Cosme to discuss  Chief Complaint   Patient presents with   • Follow-up     3 month diabetic follow up.    • Sinus Problem     Per patient may have sinus infection. Pt has been using a nasal spray but hasn't really been helping.      He is an established patient of mine.  He is in [place] and his  identity has been established.  Roscoe understands that we are limiting  office visits due to the coronavirus pandemic  and he consents to a virtual visit with  charges submitted to his insurance.  11-20 minutes were spent in this  encounter.    SUBJECTIVE:   CHIEF COMPLAINT:  Chief Complaint   Patient presents with   • Follow-up     3 month diabetic follow up.    • Sinus Problem     Per patient may have sinus infection. Pt has been using a nasal spray but hasn't really been helping.        HPI:     Roscoe states that he is sheltering at place from current COVID19 pandemic.  He and family have been healthy.   He is good on refills right now.  His blood sugars have been around  qAM for the last month. His diet is about the same. He denies weight gain.   He is checking his BP at home. Usually 160-170 systolic.   Smoking about 12 cigarettes per day.  His wife is still smoking. He recently quit for about 2 weeks.     RT knee hurting on/off for the last few weeks, worse w/ weather changes.  Painful to walk around in AM, gets better during the day. No swelling.     For several months he's had sinus congestion. He's taking Sinex OTC spray.  No fevers.  No severe pain.      Medication and allergies reconciled over the phone.    Patient Active Problem List   Diagnosis   • Balance disorder   • Benign essential hypertension   • Bilateral edema of lower extremity   • CAD S/P  percutaneous coronary angioplasty   • Cervical radiculopathy   • Coronary artery disease involving native coronary artery of native heart without angina pectoris   • Diabetic macular edema of both eyes (CMS/HCC)   • Diabetic peripheral neuropathy associated with type 2 diabetes mellitus (CMS/HCC)   • Proliferative diabetic retinopathy of both eyes associated with type 2 diabetes mellitus (CMS/HCC)   • Hypercholesterolemia   • Lower urinary tract symptoms (LUTS)   • Other specified types of non-hodgkin lymphoma, lymph nodes of multiple sites (CMS/HCC)   • Nicotine dependence   • Numbness on right side   • Occlusion and stenosis of cerebral artery   • Diabetes mellitus with renal manifestations, uncontrolled (CMS/HCC)   • Prostate cancer (CMS/HCC)   • Obstructive sleep apnea syndrome   • Status post CVA   • SLAP lesion of right shoulder   • Chronic kidney disease, stage 4 (severe) (CMS/[facility])   • Adjustment reaction with anxiety and depression   • Age-related cataract of both eyes   • Other constipation   • Olecranon bursitis of left elbow   • Encounter for screening for lung cancer   • Primary osteoarthritis of right knee   • Nasal sinus congestion       Current Outpatient Medications   Medication Sig Dispense Refill   • Misc. Devices (RUBBER BATH MAT) Misc bath mat     • Elastic Bandages & Supports (DIABETIC SOCKS LARGE) Misc diabetes  crew socks black l     • Hydrocortisone (CORTIZONE-10 DIABETICS SKIN EX) diabetic skin relief foot crea     • Triamcinolone Acetonide (NASACORT ALLERGY 24HR NA) nasacort allergy 24 hour gener     • electrolyte/PEG 3350 (GAVILYTE-G) 236 g solution GaviLyte-G 236 gram-22.74 gram-6.74 gram-5.86 gram oral solution     • levoFLOXacin (LEVAQUIN) 500 MG tablet levofloxacin 500 mg tablet     • buPROPion XL (WELLBUTRIN XL) 150 MG 24 hr tablet bupropion HCl  mg 24 hr tablet, extended release     • carvedilol (COREG) 12.5 MG tablet TAKE 1 TABLET BY MOUTH TWICE DAILY WITH MEALS 180 tablet 0    • losartan (COZAAR) 100 MG tablet TAKE 1 TABLET EVERY DAY IN THE MORNING FOR HIGH BLOOD PRESSURE 90 tablet 0   • amLODIPine (NORVASC) 10 MG tablet TAKE 1 TABLET EVERY DAY 90 tablet 0   • LANTUS 100 UNIT/ML vial solution INJECT 90 UNITS SUBCUTANEOUSLY ONCE DAILY AT NIGHT 90 mL 1   • atropine (ISOPTO ATROPINE) 1 % ophthalmic solution INSTILL 1 DROP INTO RIGHT EYE ONCE DAILY  6   • Continuous Blood Gluc Sensor (FREESTYLE TIMUR SENSOR SYSTEM) Misc Use as directed. Change sensor every 14 days 1 each 0   • Cholecalciferol (VITAMIN D) 50 mcg (2,000 units) tablet Take 1 tablet by mouth daily. 90 tablet 3   • Continuous Blood Gluc Sensor (FREESTYLE TIMUR 14 DAY SENSOR) Misc 1 each every 14 days. 6 each 3   • furosemide (LASIX) 40 MG tablet Take 1 tablet by mouth 2 times daily. 180 tablet 3   • insulin regular, human, (NOVOLIN R RELION) 100 UNIT/ML injectable solution Inject 24 Units into the skin 3 times daily (before meals). 3 vial 3   • latanoprost (XALATAN) 0.005 % ophthalmic solution INSTILL 1 DROP INTO EACH EYE ONCE DAILY AT BEDTIME  6   • ACCU-CHEK FASTCLIX LANCETS Misc Test 3x/day 100 each 3   • Insulin Syringe-Needle U-100 (BD INSULIN SYRINGE ULTRAFINE) 31G X 5/16\" 0.5 ML Misc Use 4 times daily with insulin 100 each 11   • aspirin 81 MG tablet Take 81 mg by mouth daily.     • cloNIDine (CATAPRES) 0.1 MG tablet TAKE 2 TABLETS TWICE DAILY FOR HIGH BLOOD PRESSURE 360 tablet 3   • linaclotide (LINZESS) 145 MCG capsule Take 1 capsule by mouth 2 times daily as needed for Constipation. 180 capsule 3   • metOLazone (ZAROXOLYN) 5 MG tablet TAKE 1/2 (ONE-HALF) TABLET BY MOUTH EVERY OTHER DAY 22 tablet 8   • atorvastatin (LIPITOR) 40 MG tablet TAKE 1 TABLET DAILY AT BEDTIME FOR HIGH CHOLESTEROL 90 tablet 3   • hydrALAZINE (APRESOLINE) 50 MG tablet Take 1 tablet by mouth 2 (two) times a day. 180 tablet 3   • diclofenac (VOLTAREN) 1 % gel Apply 4 g topically 4 times daily. Apply to the right knee. 300 g 0   • prednisoLONE  acetate (PRED FORTE) 1 % ophthalmic suspension      • dorzolamide-timolol (COSOPT) 22.3-6.8 MG/ML ophthalmic solution      • tamsulosin (FLOMAX) 0.4 MG Cap TAKE 1 CAPSULE BY MOUTH ONCE DAILY FOR 30 DAYS  3     No current facility-administered medications for this visit.        ALLERGIES:   Allergen Reactions   • Penicillins Other (See Comments)     Unknown        Past Surgical History:   Procedure Laterality Date   • Colonoscopy  11/06/2017    Ascedning tubular adenoma. Rectal hyperplastic polyps.       Family History   Problem Relation Age of Onset   • Alcohol Abuse Son    • Cirrhosis Son         Social History     Tobacco Use   • Smoking status: Former Smoker     Packs/day: 1.50     Years: 55.00     Pack years: 82.50     Types: Cigarettes   • Smokeless tobacco: Never Used   Substance Use Topics   • Alcohol use: Yes     Frequency: Monthly or less   • Drug use: Not Currently        Review of Systems   HENT: Positive for sinus pressure. Negative for sinus pain.    Respiratory: Negative for cough, shortness of breath and wheezing.    Cardiovascular: Positive for leg swelling. Negative for chest pain and palpitations.   Gastrointestinal: Negative for nausea and vomiting.        PATIENT REPORTED VITALS/IMAGES   Speech calm, no distress.      Physical exam deferred given telephone call    Previous information:  Wt Readings from Last 1 Encounters:   05/28/20 97.5 kg (215 lb)      BP Readings from Last 1 Encounters:   05/28/20 (!) 163/70     BMI Readings from Last 1 Encounters:   05/28/20 33.67 kg/m²          ASSESSMENT AND PLAN:   The following was ordered/assessed during today's call:   Problem List Items Addressed This Visit        Circulatory    Benign essential hypertension     Uncontrolled, increase hydralazine to 50 mg twice a day from 25 mg.  Continue home blood pressure monitoring.  Advised to quit smoking.         Relevant Medications    hydrALAZINE (APRESOLINE) 50 MG tablet    CAD S/P percutaneous coronary  angioplasty    Relevant Medications    hydrALAZINE (APRESOLINE) 50 MG tablet    Coronary artery disease involving native coronary artery of native heart without angina pectoris     Status post PCI, continue medical management.  Follow-up as scheduled with cardiology         Relevant Medications    hydrALAZINE (APRESOLINE) 50 MG tablet    Other Relevant Orders    LIPID PANEL WITH REFLEX       Urinary    Chronic kidney disease, stage 4 (severe) (CMS/[facility]) - Primary     Follow-up with nephrology, new referral generated today. Reminded to avoid NSAIDs         Relevant Orders    CBC WITH DIFFERENTIAL    COMPREHENSIVE METABOLIC PANEL       Musculoskeletal    Primary osteoarthritis of right knee     Most likely diagnosis based on symptoms.  Recommend ice.  He is taking acetaminophen as needed, and can continue to do so.  Trial of diclofenac topical 1% gel.         Relevant Medications    diclofenac (VOLTAREN) 1 % gel       Endocrine    Diabetes mellitus with renal manifestations, uncontrolled (CMS/[facility])     Recent fasting sugars have shown good control.  Come in for lab work.  Continue current medication for now.         Relevant Orders    GLYCOHEMOGLOBIN    SERVICE TO NEPHROLOGY          Routine labs or other diagnostics ordered to be done at future date when permitted/safe.    Return for as scheuduled in June..    Instructions provided as documented in the AVS.    The patient indicated understanding of the diagnosis, agreed with the plan of care and agreed to call or return with any new or worsening symptoms.    Sridhar Sanon, DO  5/28/2020                no

## 2020-06-03 ENCOUNTER — EMERGENCY (EMERGENCY)
Facility: HOSPITAL | Age: 40
LOS: 1 days | Discharge: HOME | End: 2020-06-03
Attending: EMERGENCY MEDICINE
Payer: COMMERCIAL

## 2020-06-03 VITALS
HEART RATE: 129 BPM | WEIGHT: 130.07 LBS | OXYGEN SATURATION: 100 % | DIASTOLIC BLOOD PRESSURE: 72 MMHG | SYSTOLIC BLOOD PRESSURE: 94 MMHG | TEMPERATURE: 99 F | HEIGHT: 66 IN | RESPIRATION RATE: 15 BRPM

## 2020-06-03 DIAGNOSIS — O02.1 MISSED ABORTION: Chronic | ICD-10-CM

## 2020-06-03 LAB
ALBUMIN SERPL ELPH-MCNC: 4.6 G/DL — SIGNIFICANT CHANGE UP (ref 3.3–5)
ALP SERPL-CCNC: 59 U/L — SIGNIFICANT CHANGE UP (ref 40–120)
ALT FLD-CCNC: 35 U/L — SIGNIFICANT CHANGE UP (ref 10–45)
ANION GAP SERPL CALC-SCNC: 13 MMOL/L — SIGNIFICANT CHANGE UP (ref 5–17)
APPEARANCE UR: CLEAR — SIGNIFICANT CHANGE UP
AST SERPL-CCNC: 28 U/L — SIGNIFICANT CHANGE UP (ref 10–40)
BACTERIA # UR AUTO: NEGATIVE — SIGNIFICANT CHANGE UP
BASOPHILS # BLD AUTO: 0.01 K/UL — SIGNIFICANT CHANGE UP (ref 0–0.2)
BASOPHILS NFR BLD AUTO: 0.2 % — SIGNIFICANT CHANGE UP (ref 0–2)
BILIRUB SERPL-MCNC: 0.4 MG/DL — SIGNIFICANT CHANGE UP (ref 0.2–1.2)
BILIRUB UR-MCNC: NEGATIVE — SIGNIFICANT CHANGE UP
BUN SERPL-MCNC: 10 MG/DL — SIGNIFICANT CHANGE UP (ref 7–23)
CALCIUM SERPL-MCNC: 9.4 MG/DL — SIGNIFICANT CHANGE UP (ref 8.4–10.5)
CHLORIDE SERPL-SCNC: 100 MMOL/L — SIGNIFICANT CHANGE UP (ref 96–108)
CO2 SERPL-SCNC: 22 MMOL/L — SIGNIFICANT CHANGE UP (ref 22–31)
COLOR SPEC: YELLOW — SIGNIFICANT CHANGE UP
CREAT SERPL-MCNC: 0.73 MG/DL — SIGNIFICANT CHANGE UP (ref 0.5–1.3)
DIFF PNL FLD: ABNORMAL
EOSINOPHIL # BLD AUTO: 0.02 K/UL — SIGNIFICANT CHANGE UP (ref 0–0.5)
EOSINOPHIL NFR BLD AUTO: 0.3 % — SIGNIFICANT CHANGE UP (ref 0–6)
EPI CELLS # UR: 4 /HPF — SIGNIFICANT CHANGE UP
GLUCOSE SERPL-MCNC: 103 MG/DL — HIGH (ref 70–99)
GLUCOSE UR QL: NEGATIVE — SIGNIFICANT CHANGE UP
HCT VFR BLD CALC: 41.6 % — SIGNIFICANT CHANGE UP (ref 34.5–45)
HGB BLD-MCNC: 14 G/DL — SIGNIFICANT CHANGE UP (ref 11.5–15.5)
HYALINE CASTS # UR AUTO: 0 /LPF — SIGNIFICANT CHANGE UP (ref 0–7)
IMM GRANULOCYTES NFR BLD AUTO: 0.3 % — SIGNIFICANT CHANGE UP (ref 0–1.5)
KETONES UR-MCNC: ABNORMAL
LEUKOCYTE ESTERASE UR-ACNC: NEGATIVE — SIGNIFICANT CHANGE UP
LYMPHOCYTES # BLD AUTO: 1.03 K/UL — SIGNIFICANT CHANGE UP (ref 1–3.3)
LYMPHOCYTES # BLD AUTO: 16.4 % — SIGNIFICANT CHANGE UP (ref 13–44)
MCHC RBC-ENTMCNC: 29.2 PG — SIGNIFICANT CHANGE UP (ref 27–34)
MCHC RBC-ENTMCNC: 33.7 GM/DL — SIGNIFICANT CHANGE UP (ref 32–36)
MCV RBC AUTO: 86.7 FL — SIGNIFICANT CHANGE UP (ref 80–100)
MONOCYTES # BLD AUTO: 0.56 K/UL — SIGNIFICANT CHANGE UP (ref 0–0.9)
MONOCYTES NFR BLD AUTO: 8.9 % — SIGNIFICANT CHANGE UP (ref 2–14)
NEUTROPHILS # BLD AUTO: 4.63 K/UL — SIGNIFICANT CHANGE UP (ref 1.8–7.4)
NEUTROPHILS NFR BLD AUTO: 73.9 % — SIGNIFICANT CHANGE UP (ref 43–77)
NITRITE UR-MCNC: NEGATIVE — SIGNIFICANT CHANGE UP
NRBC # BLD: 0 /100 WBCS — SIGNIFICANT CHANGE UP (ref 0–0)
PH UR: 6 — SIGNIFICANT CHANGE UP (ref 5–8)
PLATELET # BLD AUTO: 156 K/UL — SIGNIFICANT CHANGE UP (ref 150–400)
POTASSIUM SERPL-MCNC: 3.9 MMOL/L — SIGNIFICANT CHANGE UP (ref 3.5–5.3)
POTASSIUM SERPL-SCNC: 3.9 MMOL/L — SIGNIFICANT CHANGE UP (ref 3.5–5.3)
PROT SERPL-MCNC: 7.5 G/DL — SIGNIFICANT CHANGE UP (ref 6–8.3)
PROT UR-MCNC: ABNORMAL
RAPID RVP RESULT: SIGNIFICANT CHANGE UP
RBC # BLD: 4.8 M/UL — SIGNIFICANT CHANGE UP (ref 3.8–5.2)
RBC # FLD: 13.7 % — SIGNIFICANT CHANGE UP (ref 10.3–14.5)
RBC CASTS # UR COMP ASSIST: 6 /HPF — HIGH (ref 0–4)
SARS-COV-2 RNA SPEC QL NAA+PROBE: SIGNIFICANT CHANGE UP
SODIUM SERPL-SCNC: 135 MMOL/L — SIGNIFICANT CHANGE UP (ref 135–145)
SP GR SPEC: 1.03 — HIGH (ref 1.01–1.02)
UROBILINOGEN FLD QL: ABNORMAL
WBC # BLD: 6.27 K/UL — SIGNIFICANT CHANGE UP (ref 3.8–10.5)
WBC # FLD AUTO: 6.27 K/UL — SIGNIFICANT CHANGE UP (ref 3.8–10.5)
WBC UR QL: 1 /HPF — SIGNIFICANT CHANGE UP (ref 0–5)

## 2020-06-03 PROCEDURE — 71046 X-RAY EXAM CHEST 2 VIEWS: CPT | Mod: 26

## 2020-06-03 PROCEDURE — 99284 EMERGENCY DEPT VISIT MOD MDM: CPT

## 2020-06-03 RX ORDER — KETOROLAC TROMETHAMINE 30 MG/ML
15 SYRINGE (ML) INJECTION ONCE
Refills: 0 | Status: DISCONTINUED | OUTPATIENT
Start: 2020-06-03 | End: 2020-06-03

## 2020-06-03 RX ORDER — SODIUM CHLORIDE 9 MG/ML
1000 INJECTION INTRAMUSCULAR; INTRAVENOUS; SUBCUTANEOUS ONCE
Refills: 0 | Status: COMPLETED | OUTPATIENT
Start: 2020-06-03 | End: 2020-06-03

## 2020-06-03 RX ADMIN — Medication 15 MILLIGRAM(S): at 22:56

## 2020-06-03 RX ADMIN — SODIUM CHLORIDE 1000 MILLILITER(S): 9 INJECTION INTRAMUSCULAR; INTRAVENOUS; SUBCUTANEOUS at 20:49

## 2020-06-03 RX ADMIN — SODIUM CHLORIDE 1000 MILLILITER(S): 9 INJECTION INTRAMUSCULAR; INTRAVENOUS; SUBCUTANEOUS at 22:54

## 2020-06-03 NOTE — ED PROVIDER NOTE - ATTENDING CONTRIBUTION TO CARE
I saw and evaluated patient with resident. I discussed H+P and MDM with resident. I agree with the statements made by the resident unless otherwise noted.    The care of this patient was in support of the [facility] countermeasures to Covid-19.

## 2020-06-03 NOTE — ED PROVIDER NOTE - NSFOLLOWUPINSTRUCTIONS_ED_ALL_ED_FT
Follow-up with your Primary Care Physician within 24-48 hours.  Please return to the Emergency Department immediately for any new, worsening or concerning symptoms.    Copy of your lab work, imaging and/or other testing performed in the ER is attached along with your discharge paperwork.  Please take this to your Primary Care Physician for further discussion, evaluation and comparison with your prior blood work results.

## 2020-06-03 NOTE — ED PROVIDER NOTE - CLINICAL SUMMARY MEDICAL DECISION MAKING FREE TEXT BOX
40 year-old female with history of hypothyroidism presents to the Emergency Department for fever; no focal source of infection.  Plan for COVID; RVP, blood work and UA.  IVF, EKG.  Patient otherwise non-toxic; if w/u negative - plan to d/c home. 40 year-old female with history of hypothyroidism presents to the Emergency Department for fever; no focal source of infection.  Plan for COVID; RVP, blood work and UA.  IVF, EKG.  Patient otherwise non-toxic; if w/u negative - plan to d/c home.    ADI Dumont MD: PT is a 40 year-old female with history of hypothyroidism who presents to the Emergency Department for fever.  Patient reportedly had COVID infection in March and now has with positive antibodies. Pt reports having fevers for the past 3 days with TMax of 103.  Had fever PTA and took Tylenol approx. 2 hours ago.  + generalized malaise and nausea.  Works as a RN at [employer]. Pt denies: chest pain, SOB, cough, pleuritic chest pain, abdominal pain, n/v/d, back pain, neck pain, HA, neck stiffness, focal numbness or weakness, visual changes, dizziness, lightheadedness, leg pain/swelling, recent travel, recent trauma, recent immobilization, dysuria, hematuria, rash, insect or tick bites. Low suspicion for SBI at this time. No focal signs/sx on exam aside from fever. Pt is hemodynamically stable. Ddx includes, however, is not limited to: viral syndrome, uti, pna, other. Plan: basic labs, u/a, ucx, RVP, reassess for possible outpt f/u with strict return precautions

## 2020-06-03 NOTE — ED PROVIDER NOTE - PHYSICAL EXAMINATION
*Gen: NAD, AAO*3  *HEENT: NC/AT, MMM, airway patent, trachea midline  *CV: RRR, S1/S2 present, no murmurs/rubs  *Resp: no respiratory distress, LCTAB, no wheezing/rales  *Abd: non-distended, soft N/Tx4, no guarding or rigidity  *Neuro: no focal neuro deficits, moving all limbs appropriately  *Extremities: no gross deformity  *Skin: no rashes, no wounds   ~ Cristina Medina M.D.

## 2020-06-03 NOTE — ED PROVIDER NOTE - OBJECTIVE STATEMENT
40 year-old female with history of hypothyroidism presents to the Emergency Department for fever.  Patient had COVID in March with positive antibodies.  Reports having fever for the past 3 days with TMax of 103.  Had fever PTA and took Tylenol approx. 2 hours ago.  + generalized malaise and nausea.  No vomiting, chest pain, shortness of breath, abdominal pain, dysuria.  Currently menstruating; no discharge.  Works as a RN at [employer].

## 2020-06-03 NOTE — ED ADULT NURSE NOTE - NS TRANSFER PATIENT BELONGINGS
Clothing/Cell Phone/PDA (specify) Detail Level: Detailed Quality 110: Preventive Care And Screening: Influenza Immunization: Influenza Immunization not Administered for Documented Reasons.

## 2020-06-03 NOTE — ED PROVIDER NOTE - PATIENT PORTAL LINK FT
You can access the FollowMyHealth Patient Portal offered by [facility] by registering at the following website: [email or web address]. By joining [facility]’s FollowMyHealth portal, you will also be able to view your health information using other applications (apps) compatible with our system.

## 2020-06-03 NOTE — ED PROVIDER NOTE - PROGRESS NOTE DETAILS
Carol HINSON: Patient's labs and imaging w/o acute abnormalities.  Patient reports feeling well.  Patient is active.  No chest pain, shortness of breath or lightheaded.  Usual BP in the 90s systolic. Attending note (Harsha): patient labs reviewed, no acute actionable findings.  Vitals reviewed, have normalized after iv fluids (borderline hypotension, 100/60s but normal as per patient for her).  Is ambulating in department without apparent difficulty or need for assistance.  Likely viral syndrome is stable for dc with continued evaluation as outpatient through pcp.

## 2020-06-04 VITALS
HEART RATE: 72 BPM | DIASTOLIC BLOOD PRESSURE: 62 MMHG | RESPIRATION RATE: 14 BRPM | SYSTOLIC BLOOD PRESSURE: 106 MMHG | TEMPERATURE: 98 F | OXYGEN SATURATION: 100 %

## 2020-06-04 PROCEDURE — 87633 RESP VIRUS 12-25 TARGETS: CPT

## 2020-06-04 PROCEDURE — 96374 THER/PROPH/DIAG INJ IV PUSH: CPT

## 2020-06-04 PROCEDURE — 99284 EMERGENCY DEPT VISIT MOD MDM: CPT | Mod: 25

## 2020-06-04 PROCEDURE — 87798 DETECT AGENT NOS DNA AMP: CPT

## 2020-06-04 PROCEDURE — 80053 COMPREHEN METABOLIC PANEL: CPT

## 2020-06-04 PROCEDURE — 71046 X-RAY EXAM CHEST 2 VIEWS: CPT

## 2020-06-04 PROCEDURE — 81001 URINALYSIS AUTO W/SCOPE: CPT

## 2020-06-04 PROCEDURE — 85027 COMPLETE CBC AUTOMATED: CPT

## 2020-06-04 PROCEDURE — 87486 CHLMYD PNEUM DNA AMP PROBE: CPT

## 2020-06-04 PROCEDURE — 93005 ELECTROCARDIOGRAM TRACING: CPT

## 2020-06-04 PROCEDURE — 96361 HYDRATE IV INFUSION ADD-ON: CPT | Mod: XU

## 2020-06-04 PROCEDURE — 87581 M.PNEUMON DNA AMP PROBE: CPT

## 2020-06-04 RX ORDER — SODIUM CHLORIDE 9 MG/ML
1000 INJECTION INTRAMUSCULAR; INTRAVENOUS; SUBCUTANEOUS ONCE
Refills: 0 | Status: COMPLETED | OUTPATIENT
Start: 2020-06-04 | End: 2020-06-04

## 2020-06-04 RX ADMIN — SODIUM CHLORIDE 1000 MILLILITER(S): 9 INJECTION INTRAMUSCULAR; INTRAVENOUS; SUBCUTANEOUS at 02:16

## 2020-06-04 RX ADMIN — SODIUM CHLORIDE 1000 MILLILITER(S): 9 INJECTION INTRAMUSCULAR; INTRAVENOUS; SUBCUTANEOUS at 00:38

## 2020-10-12 ENCOUNTER — APPOINTMENT (OUTPATIENT)
Dept: INTERNAL MEDICINE | Facility: CLINIC | Age: 40
End: 2020-10-12
Payer: COMMERCIAL

## 2020-10-12 ENCOUNTER — LABORATORY RESULT (OUTPATIENT)
Age: 40
End: 2020-10-12

## 2020-10-12 VITALS
BODY MASS INDEX: 21.95 KG/M2 | OXYGEN SATURATION: 94 % | SYSTOLIC BLOOD PRESSURE: 112 MMHG | HEART RATE: 87 BPM | TEMPERATURE: 98.3 F | DIASTOLIC BLOOD PRESSURE: 70 MMHG | WEIGHT: 136 LBS

## 2020-10-12 DIAGNOSIS — N97.0 FEMALE INFERTILITY ASSOCIATED WITH ANOVULATION: ICD-10-CM

## 2020-10-12 DIAGNOSIS — Z87.59 PERSONAL HISTORY OF OTHER COMPLICATIONS OF PREGNANCY, CHILDBIRTH AND THE PUERPERIUM: ICD-10-CM

## 2020-10-12 DIAGNOSIS — Z80.3 FAMILY HISTORY OF MALIGNANT NEOPLASM OF BREAST: ICD-10-CM

## 2020-10-12 DIAGNOSIS — Z87.42 PERSONAL HISTORY OF OTHER DISEASES OF THE FEMALE GENITAL TRACT: ICD-10-CM

## 2020-10-12 DIAGNOSIS — Z82.69 FAMILY HISTORY OF OTHER DISEASES OF THE MUSCULOSKELETAL SYSTEM AND CONNECTIVE TISSUE: ICD-10-CM

## 2020-10-12 DIAGNOSIS — Z83.3 FAMILY HISTORY OF DIABETES MELLITUS: ICD-10-CM

## 2020-10-12 DIAGNOSIS — Z82.61 FAMILY HISTORY OF ARTHRITIS: ICD-10-CM

## 2020-10-12 PROCEDURE — G0444 DEPRESSION SCREEN ANNUAL: CPT | Mod: NC,59

## 2020-10-12 PROCEDURE — 36415 COLL VENOUS BLD VENIPUNCTURE: CPT

## 2020-10-12 PROCEDURE — 99385 PREV VISIT NEW AGE 18-39: CPT | Mod: 25

## 2020-10-12 PROCEDURE — G0442 ANNUAL ALCOHOL SCREEN 15 MIN: CPT | Mod: NC

## 2020-10-12 RX ORDER — NEEDLES, FILTER 19GX1 1/2"
22G X 1-1/2" NEEDLE, DISPOSABLE MISCELLANEOUS
Qty: 30 | Refills: 2 | Status: DISCONTINUED | COMMUNITY
Start: 2017-01-27 | End: 2020-10-12

## 2020-10-12 RX ORDER — MISOPROSTOL 200 UG/1
200 TABLET ORAL DAILY
Qty: 8 | Refills: 0 | Status: DISCONTINUED | COMMUNITY
Start: 2018-01-03 | End: 2020-10-12

## 2020-10-12 RX ORDER — NEEDLES, DISPOSABLE 25GX5/8"
27G X 1/2" NEEDLE, DISPOSABLE MISCELLANEOUS
Qty: 30 | Refills: 1 | Status: DISCONTINUED | COMMUNITY
Start: 2017-01-27 | End: 2020-10-12

## 2020-10-12 RX ORDER — FOLLITROPIN ALFA 1050 UNIT
1050 KIT SUBCUTANEOUS
Qty: 3 | Refills: 1 | Status: DISCONTINUED | COMMUNITY
Start: 2017-01-27 | End: 2020-10-12

## 2020-10-12 RX ORDER — DESOGESTREL AND ETHINYL ESTRADIOL 0.15-0.03
0.15-3 KIT ORAL DAILY
Qty: 1 | Refills: 1 | Status: DISCONTINUED | COMMUNITY
Start: 2017-01-09 | End: 2020-10-12

## 2020-10-12 RX ORDER — METHYLPREDNISOLONE 8 MG/1
8 TABLET ORAL
Qty: 12 | Refills: 0 | Status: DISCONTINUED | COMMUNITY
Start: 2017-02-07 | End: 2020-10-12

## 2020-10-12 RX ORDER — ISOPROPYL ALCOHOL 70 ML/100ML
SWAB TOPICAL
Qty: 50 | Refills: 0 | Status: DISCONTINUED | COMMUNITY
Start: 2017-01-27 | End: 2020-10-12

## 2020-10-12 RX ORDER — MENOTROPINS 75 UNIT
75 KIT SUBCUTANEOUS
Qty: 25 | Refills: 1 | Status: DISCONTINUED | COMMUNITY
Start: 2017-01-27 | End: 2020-10-12

## 2020-10-12 RX ORDER — ENOXAPARIN SODIUM 100 MG/ML
40 INJECTION SUBCUTANEOUS
Qty: 30 | Refills: 2 | Status: DISCONTINUED | COMMUNITY
Start: 2018-07-25 | End: 2020-10-12

## 2020-10-12 RX ORDER — PROGESTERONE 90 MG/1.125G
8 GEL VAGINAL DAILY
Qty: 15 | Refills: 2 | Status: DISCONTINUED | COMMUNITY
Start: 2017-02-07 | End: 2020-10-12

## 2020-10-12 RX ORDER — CHORIONIC GONADOTROPIN 10000 UNIT
10000 KIT INTRAMUSCULAR
Qty: 1 | Refills: 0 | Status: DISCONTINUED | COMMUNITY
Start: 2017-01-27 | End: 2020-10-12

## 2020-10-12 RX ORDER — GANIRELIX ACETATE 250 UG/.5ML
250 INJECTION, SOLUTION SUBCUTANEOUS
Qty: 7 | Refills: 1 | Status: DISCONTINUED | COMMUNITY
Start: 2017-03-05 | End: 2020-10-12

## 2020-10-12 RX ORDER — DOXYCYCLINE HYCLATE 100 MG/1
100 CAPSULE ORAL TWICE DAILY
Qty: 33 | Refills: 0 | Status: DISCONTINUED | COMMUNITY
Start: 2017-01-27 | End: 2020-10-12

## 2020-10-12 RX ORDER — CONTAINER,EMPTY
EACH MISCELLANEOUS
Qty: 1 | Refills: 0 | Status: DISCONTINUED | COMMUNITY
Start: 2017-01-27 | End: 2020-10-12

## 2020-10-12 RX ORDER — GANIRELIX ACETATE 250 UG/.5ML
250 INJECTION, SOLUTION SUBCUTANEOUS
Qty: 7 | Refills: 0 | Status: DISCONTINUED | COMMUNITY
Start: 2017-01-27 | End: 2020-10-12

## 2020-10-12 NOTE — HEALTH RISK ASSESSMENT
[Good] : ~his/her~  mood as  good [No] : No [No falls in past year] : Patient reported no falls in the past year [0] : 2) Feeling down, depressed, or hopeless: Not at all (0) [Patient reported PAP Smear was normal] : Patient reported PAP Smear was normal [None] : None [With Significant Other] : lives with significant other [Employed] : employed [] :  [# Of Children ___] : has [unfilled] children [] : No [de-identified] : [place] [employer] - weights  [[id number]] : 0 [Reports changes in hearing] : Reports no changes in hearing [Reports changes in vision] : Reports no changes in vision [Reports changes in dental health] : Reports no changes in dental health [PapSmearDate] : 3/2019  [FreeTextEntry2] : [facility]

## 2020-10-12 NOTE — ASSESSMENT
[FreeTextEntry1] : \par Malaise FAtigue \par family hx of SLE/ RA and Type 1 DM \par -personal 8 miscarriages \par - get DORETHA , DNA, ESR, RF\par \par hx hypothyroidism - followed by endo \par - adviced resistance training exercises \par -continue Levothyroxine at current dose \par \par HCM \par Tdap-< 10 yrs ago \par Flu vaccine- 10/7/2020 at work \par PAP- 12/2019 neg \par Mammo- fx hx Paternal Aunt Breast ca in 40;s get mammo \par

## 2020-10-14 LAB
25(OH)D3 SERPL-MCNC: 35 NG/ML
ALBUMIN SERPL ELPH-MCNC: 4.6 G/DL
ALP BLD-CCNC: 44 U/L
ALT SERPL-CCNC: 11 U/L
ANA SER IF-ACNC: NEGATIVE
ANION GAP SERPL CALC-SCNC: 13 MMOL/L
APPEARANCE: ABNORMAL
AST SERPL-CCNC: 15 U/L
BASOPHILS # BLD AUTO: 0.03 K/UL
BASOPHILS NFR BLD AUTO: 0.5 %
BILIRUB SERPL-MCNC: 0.2 MG/DL
BILIRUBIN URINE: NEGATIVE
BLOOD URINE: ABNORMAL
BUN SERPL-MCNC: 15 MG/DL
CALCIUM SERPL-MCNC: 9.4 MG/DL
CCP AB SER IA-ACNC: <8 UNITS
CHLORIDE SERPL-SCNC: 103 MMOL/L
CHOLEST SERPL-MCNC: 180 MG/DL
CHOLEST/HDLC SERPL: 2.8 RATIO
CO2 SERPL-SCNC: 24 MMOL/L
COLOR: YELLOW
CREAT SERPL-MCNC: 0.67 MG/DL
DSDNA AB SER-ACNC: <12 IU/ML
EOSINOPHIL # BLD AUTO: 0.33 K/UL
EOSINOPHIL NFR BLD AUTO: 5.1 %
ERYTHROCYTE [SEDIMENTATION RATE] IN BLOOD BY WESTERGREN METHOD: 2 MM/HR
ESTIMATED AVERAGE GLUCOSE: 100 MG/DL
GLUCOSE QUALITATIVE U: NEGATIVE
GLUCOSE SERPL-MCNC: 73 MG/DL
HBA1C MFR BLD HPLC: 5.1 %
HCT VFR BLD CALC: 41.5 %
HDLC SERPL-MCNC: 64 MG/DL
HGB BLD-MCNC: 13.3 G/DL
IMM GRANULOCYTES NFR BLD AUTO: 0.2 %
KETONES URINE: NEGATIVE
LDLC SERPL CALC-MCNC: 98 MG/DL
LEUKOCYTE ESTERASE URINE: NEGATIVE
LYMPHOCYTES # BLD AUTO: 2.74 K/UL
LYMPHOCYTES NFR BLD AUTO: 42.6 %
MAN DIFF?: NORMAL
MCHC RBC-ENTMCNC: 29.3 PG
MCHC RBC-ENTMCNC: 32 GM/DL
MCV RBC AUTO: 91.4 FL
MONOCYTES # BLD AUTO: 0.37 K/UL
MONOCYTES NFR BLD AUTO: 5.8 %
NEUTROPHILS # BLD AUTO: 2.95 K/UL
NEUTROPHILS NFR BLD AUTO: 45.8 %
NITRITE URINE: NEGATIVE
PH URINE: 6.5
PLATELET # BLD AUTO: 225 K/UL
POTASSIUM SERPL-SCNC: 4.7 MMOL/L
PROT SERPL-MCNC: 6.8 G/DL
PROTEIN URINE: NORMAL
RBC # BLD: 4.54 M/UL
RBC # FLD: 12.6 %
RF+CCP IGG SER-IMP: NEGATIVE
RHEUMATOID FACT SER QL: <10 IU/ML
SODIUM SERPL-SCNC: 141 MMOL/L
SPECIFIC GRAVITY URINE: 1.02
T3FREE SERPL-MCNC: 2.47 PG/ML
T4 FREE SERPL-MCNC: 1.5 NG/DL
TRIGL SERPL-MCNC: 87 MG/DL
TSH SERPL-ACNC: 2.19 UIU/ML
UROBILINOGEN URINE: NORMAL
VIT B12 SERPL-MCNC: 766 PG/ML
WBC # FLD AUTO: 6.43 K/UL

## 2020-11-02 ENCOUNTER — RX RENEWAL (OUTPATIENT)
Age: 40
End: 2020-11-02

## 2021-03-29 ENCOUNTER — RESULT REVIEW (OUTPATIENT)
Age: 41
End: 2021-03-29

## 2021-04-01 ENCOUNTER — APPOINTMENT (OUTPATIENT)
Dept: ULTRASOUND IMAGING | Facility: CLINIC | Age: 41
End: 2021-04-01

## 2021-04-01 ENCOUNTER — APPOINTMENT (OUTPATIENT)
Dept: MAMMOGRAPHY | Facility: CLINIC | Age: 41
End: 2021-04-01

## 2021-10-22 ENCOUNTER — APPOINTMENT (OUTPATIENT)
Dept: INTERNAL MEDICINE | Facility: CLINIC | Age: 41
End: 2021-10-22
Payer: COMMERCIAL

## 2021-10-22 VITALS
DIASTOLIC BLOOD PRESSURE: 66 MMHG | SYSTOLIC BLOOD PRESSURE: 104 MMHG | BODY MASS INDEX: 22.76 KG/M2 | TEMPERATURE: 98.1 F | OXYGEN SATURATION: 95 % | WEIGHT: 141 LBS | HEART RATE: 93 BPM

## 2021-10-22 DIAGNOSIS — Z23 ENCOUNTER FOR IMMUNIZATION: ICD-10-CM

## 2021-10-22 PROCEDURE — 90686 IIV4 VACC NO PRSV 0.5 ML IM: CPT

## 2021-10-22 PROCEDURE — G0442 ANNUAL ALCOHOL SCREEN 15 MIN: CPT

## 2021-10-22 PROCEDURE — G0008: CPT

## 2021-10-22 PROCEDURE — 99396 PREV VISIT EST AGE 40-64: CPT | Mod: 25

## 2021-10-22 PROCEDURE — 36415 COLL VENOUS BLD VENIPUNCTURE: CPT

## 2021-10-22 PROCEDURE — G0444 DEPRESSION SCREEN ANNUAL: CPT | Mod: 59

## 2021-10-22 RX ORDER — IBUPROFEN 200 MG/1
200 TABLET, FILM COATED ORAL EVERY 8 HOURS
Refills: 0 | Status: ACTIVE | COMMUNITY
Start: 2021-10-22

## 2021-10-22 RX ORDER — MULTIVITAMIN
TABLET ORAL DAILY
Refills: 0 | Status: ACTIVE | COMMUNITY
Start: 2021-10-22

## 2021-10-22 NOTE — ASSESSMENT
[FreeTextEntry1] : HX plantar Fascitis- followed by Podiatrist - doing Brace and exercises - working 12 hrs shifts on feet all day \par \par hx hypothyroidism - followed by endo \par - advised resistance training exercises \par -continue Levothyroxine at current dose \par \par HCM \par Tdap-< 10 yrs ago \par Flu vaccine- 10/22/21 \par PAP- 3/29/21  neg \par Mammo- fx hx Paternal Aunt Breast ca in 40;s get mammo - has appt for 11/2021 \par Pfizer 4/2021 completed -booster advised

## 2021-10-22 NOTE — HISTORY OF PRESENT ILLNESS
[de-identified] : This is a 41 yrs old female with PMHx of Hypothyroidism seen today for her annual check up \par parents and brother my pt \par Works as RN in [employer] \par \par HX hypothyroidism Dx 2000 at age 19yrs - currently on LT4 150mcg 3 days and 175 for 4 days\par -followed by endo \par \par HX plantar Fascitis- followed by Podiatrist - doing Brace and exercises - working 12 hrs shifts on feet all day

## 2021-10-22 NOTE — HEALTH RISK ASSESSMENT
[Good] : ~his/her~  mood as  good [No] : No [No falls in past year] : Patient reported no falls in the past year [0] : 2) Feeling down, depressed, or hopeless: Not at all (0) [With Significant Other] : lives with significant other [Employed] : employed [] :  [Fully functional (bathing, dressing, toileting, transferring, walking, feeding)] : Fully functional (bathing, dressing, toileting, transferring, walking, feeding) [Fully functional (using the telephone, shopping, preparing meals, housekeeping, doing laundry, using] : Fully functional and needs no help or supervision to perform IADLs (using the telephone, shopping, preparing meals, housekeeping, doing laundry, using transportation, managing medications and managing finances) [] : No [[id number]] : 0 [Patient reported PAP Smear was normal] : Patient reported PAP Smear was normal [Reports changes in hearing] : Reports no changes in hearing [Reports changes in vision] : Reports no changes in vision [Reports changes in dental health] : Reports no changes in dental health [PapSmearDate] : 3/29/21

## 2021-10-25 LAB
25(OH)D3 SERPL-MCNC: 29.1 NG/ML
ALBUMIN SERPL ELPH-MCNC: 4.7 G/DL
ALP BLD-CCNC: 38 U/L
ALT SERPL-CCNC: 12 U/L
ANION GAP SERPL CALC-SCNC: 12 MMOL/L
APPEARANCE: CLEAR
AST SERPL-CCNC: 17 U/L
BASOPHILS # BLD AUTO: 0.02 K/UL
BASOPHILS NFR BLD AUTO: 0.2 %
BILIRUB SERPL-MCNC: 0.3 MG/DL
BILIRUBIN URINE: NEGATIVE
BLOOD URINE: NEGATIVE
BUN SERPL-MCNC: 15 MG/DL
CALCIUM SERPL-MCNC: 9.8 MG/DL
CHLORIDE SERPL-SCNC: 104 MMOL/L
CHOLEST SERPL-MCNC: 160 MG/DL
CO2 SERPL-SCNC: 22 MMOL/L
COLOR: COLORLESS
CREAT SERPL-MCNC: 0.71 MG/DL
EOSINOPHIL # BLD AUTO: 0.08 K/UL
EOSINOPHIL NFR BLD AUTO: 1 %
ESTIMATED AVERAGE GLUCOSE: 97 MG/DL
GLUCOSE QUALITATIVE U: NEGATIVE
GLUCOSE SERPL-MCNC: 80 MG/DL
HBA1C MFR BLD HPLC: 5 %
HCT VFR BLD CALC: 38.6 %
HDLC SERPL-MCNC: 54 MG/DL
HGB BLD-MCNC: 13 G/DL
IMM GRANULOCYTES NFR BLD AUTO: 0.4 %
KETONES URINE: NEGATIVE
LDLC SERPL CALC-MCNC: 83 MG/DL
LEUKOCYTE ESTERASE URINE: NEGATIVE
LYMPHOCYTES # BLD AUTO: 2.64 K/UL
LYMPHOCYTES NFR BLD AUTO: 31.6 %
MAN DIFF?: NORMAL
MCHC RBC-ENTMCNC: 30.2 PG
MCHC RBC-ENTMCNC: 33.7 GM/DL
MCV RBC AUTO: 89.8 FL
MONOCYTES # BLD AUTO: 0.48 K/UL
MONOCYTES NFR BLD AUTO: 5.7 %
NEUTROPHILS # BLD AUTO: 5.1 K/UL
NEUTROPHILS NFR BLD AUTO: 61.1 %
NITRITE URINE: NEGATIVE
NONHDLC SERPL-MCNC: 106 MG/DL
PH URINE: 6.5
PLATELET # BLD AUTO: 237 K/UL
POTASSIUM SERPL-SCNC: 5 MMOL/L
PROT SERPL-MCNC: 6.9 G/DL
PROTEIN URINE: NEGATIVE
RBC # BLD: 4.3 M/UL
RBC # FLD: 12.4 %
SODIUM SERPL-SCNC: 138 MMOL/L
SPECIFIC GRAVITY URINE: 1
T4 FREE SERPL-MCNC: 1.7 NG/DL
TRIGL SERPL-MCNC: 115 MG/DL
TSH SERPL-ACNC: 0.66 UIU/ML
UROBILINOGEN URINE: NORMAL
WBC # FLD AUTO: 8.35 K/UL

## 2022-01-18 ENCOUNTER — TRANSCRIPTION ENCOUNTER (OUTPATIENT)
Age: 42
End: 2022-01-18

## 2022-02-22 ENCOUNTER — APPOINTMENT (OUTPATIENT)
Dept: ENDOCRINOLOGY | Facility: CLINIC | Age: 42
End: 2022-02-22
Payer: COMMERCIAL

## 2022-02-22 VITALS
WEIGHT: 140 LBS | HEIGHT: 66 IN | DIASTOLIC BLOOD PRESSURE: 70 MMHG | HEART RATE: 93 BPM | BODY MASS INDEX: 22.5 KG/M2 | SYSTOLIC BLOOD PRESSURE: 112 MMHG | OXYGEN SATURATION: 99 % | TEMPERATURE: 97.5 F

## 2022-02-22 PROCEDURE — 99213 OFFICE O/P EST LOW 20 MIN: CPT

## 2022-02-22 NOTE — ASSESSMENT
[FreeTextEntry1] : 42 y/o female with hypothyroidism\par \par Hypothyroidism:\par - Clinically euthyroid\par - Will do labs in 2 months, recommend check TFTs at that time\par - Continue current Levothyroxine regimen, 175 mcg QAM x 4 days of the week and 150 mcg QAM x 3 days of the week.\par \par F/u annually or based on clinical need\par \par I informed the patient that I will be leaving the practice. Recommend schedule f/u with next available endocrinologist in 12 months.\par \par Nemo Wallace, DO\par \par  [Levothyroxine] : The patient was instructed to take Levothyroxine on an empty stomach, separate from vitamins, and wait at least 30 minutes before eating

## 2022-02-22 NOTE — REVIEW OF SYSTEMS
[All other systems negative] : All other systems negative [Fatigue] : no fatigue [Recent Weight Loss (___ Lbs)] : no recent weight loss [Cold Intolerance] : no cold intolerance

## 2022-02-22 NOTE — HISTORY OF PRESENT ILLNESS
[FreeTextEntry1] : 40 y/o woman w/ hypothyroidism here for f/u visit.\par \par She was dx w/ hypothyroidism at age 19. Has strong FH of hypothyroidism in her mother and 3 sisters. Father has lupus. 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

## 2022-02-22 NOTE — PHYSICAL EXAM
[Alert] : alert [Well Nourished] : well nourished [Healthy Appearance] : healthy appearance [No Acute Distress] : no acute distress [Normal Sclera/Conjunctiva] : normal sclera/conjunctiva [Normal Hearing] : hearing was normal [No Respiratory Distress] : no respiratory distress [No Accessory Muscle Use] : no accessory muscle use [Normal Rate and Effort] : normal respiratory rate and effort [Clear to Auscultation] : lungs were clear to auscultation bilaterally [Normal S1, S2] : normal S1 and S2 [Normal Rate] : heart rate was normal [Regular Rhythm] : with a regular rhythm [No Neck Mass] : no neck mass was observed [Thyroid Not Enlarged] : the thyroid was not enlarged [No Thyroid Nodules] : no palpable thyroid nodules [No Edema] : no peripheral edema [Normal Gait] : normal gait [No Involuntary Movements] : no involuntary movements were seen [No Motor Deficits] : the motor exam was normal [No Tremors] : no tremors [Oriented x3] : oriented to person, place, and time [Normal Affect] : the affect was normal [Normal Insight/Judgement] : insight and judgment were intact [Normal Mood] : the mood was normal

## 2022-02-24 NOTE — ASU PATIENT PROFILE, ADULT - NS PRO MODE OF ARRIVAL
[FreeTextEntry3] : All medical record entries made by the Scribe were at my,  Dr. Scott Holden MD., direction and personally dictated by me on 02/24/2022. I have personally reviewed the chart and agree that the record accurately reflects my personal performance of the history, physical exam, assessment and plan.  ambulatory

## 2022-09-14 ENCOUNTER — RESULT REVIEW (OUTPATIENT)
Age: 42
End: 2022-09-14

## 2022-10-25 ENCOUNTER — APPOINTMENT (OUTPATIENT)
Dept: INTERNAL MEDICINE | Facility: CLINIC | Age: 42
End: 2022-10-25

## 2022-10-25 VITALS
TEMPERATURE: 98.3 F | HEART RATE: 81 BPM | DIASTOLIC BLOOD PRESSURE: 64 MMHG | BODY MASS INDEX: 23.14 KG/M2 | OXYGEN SATURATION: 98 % | SYSTOLIC BLOOD PRESSURE: 108 MMHG | WEIGHT: 144 LBS | HEIGHT: 66 IN

## 2022-10-25 DIAGNOSIS — Z00.00 ENCOUNTER FOR GENERAL ADULT MEDICAL EXAMINATION W/OUT ABNORMAL FINDINGS: ICD-10-CM

## 2022-10-25 DIAGNOSIS — E03.9 HYPOTHYROIDISM, UNSPECIFIED: ICD-10-CM

## 2022-10-25 DIAGNOSIS — R53.81 OTHER MALAISE: ICD-10-CM

## 2022-10-25 DIAGNOSIS — R53.83 OTHER MALAISE: ICD-10-CM

## 2022-10-25 PROCEDURE — 99396 PREV VISIT EST AGE 40-64: CPT

## 2022-10-25 RX ORDER — LEVOTHYROXINE SODIUM 0.17 MG/1
175 TABLET ORAL
Qty: 90 | Refills: 1 | Status: ACTIVE | COMMUNITY
Start: 2020-05-13 | End: 1900-01-01

## 2022-10-25 NOTE — HISTORY OF PRESENT ILLNESS
[de-identified] : 42 y.o. female, PMHx hypothyroid.  \par Feeling well, no concerns offered.  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  Recent titers done.  Had flu shot and tdap updated.  \par Continues levothyroxine.  \par

## 2022-10-25 NOTE — ASSESSMENT
[FreeTextEntry1] : Hypothyroid:\par Continues levothyroxine\par Labs today\par \par HM:\par Breast cancer screening - mammo scheduled next month \par Skin cancer screening - advised yearly with dermatology, last done about 18 mo ago\par Cervical cancer screening - pap done 9/2022\par Dental UTD\par tdap UTD - 2022\par Flu shot done already \par COVID vaccine done with booster (COVID infection at start of pandemic 2020, feels fully recovered, ?brain fog)\par check labs\par  morgan all pertinent systems normal

## 2022-10-25 NOTE — HEALTH RISK ASSESSMENT
[Patient reported PAP Smear was normal] : Patient reported PAP Smear was normal [Good] : ~his/her~  mood as  good [Never] : Never [No] : In the past 12 months have you used drugs other than those required for medical reasons? No [0] : 2) Feeling down, depressed, or hopeless: Not at all (0) [PHQ-2 Negative - No further assessment needed] : PHQ-2 Negative - No further assessment needed [With Family] : lives with family [] :  [Reports changes in dental health] : Reports changes in dental health [Smoke Detector] : smoke detector [Carbon Monoxide Detector] : carbon monoxide detector [de-identified] : 4-5 d/week  [de-identified] : regular [[id number]] : 0 [Reports changes in hearing] : Reports no changes in hearing [Reports changes in vision] : Reports no changes in vision [Guns at Home] : no guns at home [PapSmearDate] : 09/22 [de-identified] : new crowns

## 2022-10-27 LAB
ALBUMIN SERPL ELPH-MCNC: 4.7 G/DL
ALP BLD-CCNC: 41 U/L
ALT SERPL-CCNC: 6 U/L
ANION GAP SERPL CALC-SCNC: 12 MMOL/L
AST SERPL-CCNC: 13 U/L
BASOPHILS # BLD AUTO: 0.03 K/UL
BASOPHILS NFR BLD AUTO: 0.6 %
BILIRUB SERPL-MCNC: 0.5 MG/DL
BUN SERPL-MCNC: 8 MG/DL
CALCIUM SERPL-MCNC: 9.9 MG/DL
CHLORIDE SERPL-SCNC: 104 MMOL/L
CHOLEST SERPL-MCNC: 188 MG/DL
CO2 SERPL-SCNC: 25 MMOL/L
CREAT SERPL-MCNC: 0.72 MG/DL
EGFR: 108 ML/MIN/1.73M2
EOSINOPHIL # BLD AUTO: 0.09 K/UL
EOSINOPHIL NFR BLD AUTO: 1.8 %
ESTIMATED AVERAGE GLUCOSE: 97 MG/DL
GLUCOSE SERPL-MCNC: 89 MG/DL
HBA1C MFR BLD HPLC: 5 %
HCT VFR BLD CALC: 38.4 %
HDLC SERPL-MCNC: 58 MG/DL
HGB BLD-MCNC: 12.9 G/DL
IMM GRANULOCYTES NFR BLD AUTO: 0.2 %
LDLC SERPL CALC-MCNC: 105 MG/DL
LYMPHOCYTES # BLD AUTO: 2.32 K/UL
LYMPHOCYTES NFR BLD AUTO: 46.5 %
MAN DIFF?: NORMAL
MCHC RBC-ENTMCNC: 29.7 PG
MCHC RBC-ENTMCNC: 33.6 GM/DL
MCV RBC AUTO: 88.5 FL
MONOCYTES # BLD AUTO: 0.32 K/UL
MONOCYTES NFR BLD AUTO: 6.4 %
NEUTROPHILS # BLD AUTO: 2.22 K/UL
NEUTROPHILS NFR BLD AUTO: 44.5 %
NONHDLC SERPL-MCNC: 130 MG/DL
PLATELET # BLD AUTO: 229 K/UL
POTASSIUM SERPL-SCNC: 4.6 MMOL/L
PROT SERPL-MCNC: 7 G/DL
RBC # BLD: 4.34 M/UL
RBC # FLD: 12.7 %
SODIUM SERPL-SCNC: 140 MMOL/L
TRIGL SERPL-MCNC: 125 MG/DL
TSH SERPL-ACNC: 1.28 UIU/ML
WBC # FLD AUTO: 4.99 K/UL

## 2023-01-18 NOTE — ED PROVIDER NOTE - WORK/EXCUSE FORM DATE
11-Apr-2020 Qbrexza Counseling:  I discussed with the patient the risks of Qbrexza including but not limited to headache, mydriasis, blurred vision, dry eyes, nasal dryness, dry mouth, dry throat, dry skin, urinary hesitation, and constipation.  Local skin reactions including erythema, burning, stinging, and itching can also occur.

## 2023-03-11 ENCOUNTER — RESULT REVIEW (OUTPATIENT)
Age: 43
End: 2023-03-11

## 2023-03-11 ENCOUNTER — APPOINTMENT (OUTPATIENT)
Dept: ULTRASOUND IMAGING | Facility: CLINIC | Age: 43
End: 2023-03-11
Payer: COMMERCIAL

## 2023-03-11 ENCOUNTER — APPOINTMENT (OUTPATIENT)
Dept: MAMMOGRAPHY | Facility: CLINIC | Age: 43
End: 2023-03-11
Payer: COMMERCIAL

## 2023-03-11 PROCEDURE — 77063 BREAST TOMOSYNTHESIS BI: CPT

## 2023-03-11 PROCEDURE — 76641 ULTRASOUND BREAST COMPLETE: CPT | Mod: 50

## 2023-03-11 PROCEDURE — 77067 SCR MAMMO BI INCL CAD: CPT

## 2023-05-02 ENCOUNTER — RX RENEWAL (OUTPATIENT)
Age: 43
End: 2023-05-02

## 2023-11-07 ENCOUNTER — APPOINTMENT (OUTPATIENT)
Dept: ULTRASOUND IMAGING | Facility: CLINIC | Age: 43
End: 2023-11-07
Payer: COMMERCIAL

## 2023-11-07 PROCEDURE — 76642 ULTRASOUND BREAST LIMITED: CPT | Mod: 50

## 2024-05-10 ENCOUNTER — APPOINTMENT (OUTPATIENT)
Dept: ULTRASOUND IMAGING | Facility: CLINIC | Age: 44
End: 2024-05-10
Payer: COMMERCIAL

## 2024-05-10 ENCOUNTER — APPOINTMENT (OUTPATIENT)
Dept: MAMMOGRAPHY | Facility: CLINIC | Age: 44
End: 2024-05-10
Payer: COMMERCIAL

## 2024-05-10 PROCEDURE — 77063 BREAST TOMOSYNTHESIS BI: CPT

## 2024-05-10 PROCEDURE — 76641 ULTRASOUND BREAST COMPLETE: CPT | Mod: 50

## 2024-05-10 PROCEDURE — 77067 SCR MAMMO BI INCL CAD: CPT

## 2024-09-06 ENCOUNTER — APPOINTMENT (OUTPATIENT)
Dept: INTERNAL MEDICINE | Facility: CLINIC | Age: 44
End: 2024-09-06
Payer: COMMERCIAL

## 2024-09-06 ENCOUNTER — NON-APPOINTMENT (OUTPATIENT)
Age: 44
End: 2024-09-06

## 2024-09-06 VITALS
HEART RATE: 98 BPM | BODY MASS INDEX: 23.78 KG/M2 | WEIGHT: 148 LBS | SYSTOLIC BLOOD PRESSURE: 94 MMHG | OXYGEN SATURATION: 98 % | DIASTOLIC BLOOD PRESSURE: 67 MMHG | HEIGHT: 66 IN | TEMPERATURE: 98.2 F

## 2024-09-06 DIAGNOSIS — Z00.00 ENCOUNTER FOR GENERAL ADULT MEDICAL EXAMINATION W/OUT ABNORMAL FINDINGS: ICD-10-CM

## 2024-09-06 PROCEDURE — 99396 PREV VISIT EST AGE 40-64: CPT

## 2024-09-06 PROCEDURE — 36415 COLL VENOUS BLD VENIPUNCTURE: CPT

## 2024-09-06 NOTE — HEALTH RISK ASSESSMENT
[Good] : ~his/her~  mood as  good [No] : In the past 12 months have you used drugs other than those required for medical reasons? No [0] : 2) Feeling down, depressed, or hopeless: Not at all (0) [PHQ-2 Negative - No further assessment needed] : PHQ-2 Negative - No further assessment needed [Never] : Never [Patient reported mammogram was normal] : Patient reported mammogram was normal [Patient reported PAP Smear was normal] : Patient reported PAP Smear was normal [With Significant Other] : lives with significant other [Employed] : employed [] :  [de-identified] : cardio 3 x a week weight s2-3 x a week 10,000 steps a day  [[id number]] : 0 [Reports changes in hearing] : Reports no changes in hearing [Reports changes in vision] : Reports no changes in vision [Reports changes in dental health] : Reports no changes in dental health [MammogramDate] : 4/2024  [PapSmearDate] : 4/2024

## 2024-09-06 NOTE — HISTORY OF PRESENT ILLNESS
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

## 2024-09-06 NOTE — ASSESSMENT
[FreeTextEntry1] : [employer] gold ordered for work   hx hypothyroidism - followed by endo  - advised resistance training exercises  -continue Levothyroxine at current dose   EKG nSR at 83 bpm no st changes   HCM  Tdap-9/1/2022  Flu vaccine- will get at work  PAP- 4/2024 neg  Mammo- fx hx Paternal Aunt Breast ca in 40;s get mammo -4/2024 as per pt neg  Pfizer 4/2021 completed -booster advised

## 2024-09-09 LAB
ALBUMIN SERPL ELPH-MCNC: 4.8 G/DL
ALP BLD-CCNC: 48 U/L
ALT SERPL-CCNC: 7 U/L
ANION GAP SERPL CALC-SCNC: 14 MMOL/L
APPEARANCE: CLEAR
AST SERPL-CCNC: 18 U/L
BACTERIA: ABNORMAL /HPF
BILIRUB SERPL-MCNC: 0.4 MG/DL
BILIRUBIN URINE: NEGATIVE
BLOOD URINE: NEGATIVE
BUN SERPL-MCNC: 12 MG/DL
CALCIUM SERPL-MCNC: 9.8 MG/DL
CAST: 1 /LPF
CHLORIDE SERPL-SCNC: 102 MMOL/L
CHOLEST SERPL-MCNC: 199 MG/DL
CO2 SERPL-SCNC: 21 MMOL/L
COLOR: YELLOW
CREAT SERPL-MCNC: 0.73 MG/DL
EGFR: 105 ML/MIN/1.73M2
EPITHELIAL CELLS: 2 /HPF
ESTIMATED AVERAGE GLUCOSE: 100 MG/DL
GLUCOSE QUALITATIVE U: NEGATIVE MG/DL
GLUCOSE SERPL-MCNC: 83 MG/DL
HBA1C MFR BLD HPLC: 5.1 %
HCT VFR BLD CALC: 38.6 %
HDLC SERPL-MCNC: 59 MG/DL
HGB BLD-MCNC: 13 G/DL
KETONES URINE: NEGATIVE MG/DL
LDLC SERPL CALC-MCNC: 109 MG/DL
LEUKOCYTE ESTERASE URINE: NEGATIVE
MCHC RBC-ENTMCNC: 29.7 PG
MCHC RBC-ENTMCNC: 33.7 GM/DL
MCV RBC AUTO: 88.1 FL
MICROSCOPIC-UA: NORMAL
NITRITE URINE: NEGATIVE
NONHDLC SERPL-MCNC: 140 MG/DL
PH URINE: 7
PLATELET # BLD AUTO: 232 K/UL
POTASSIUM SERPL-SCNC: 4.6 MMOL/L
PROT SERPL-MCNC: 7.4 G/DL
PROTEIN URINE: NEGATIVE MG/DL
RBC # BLD: 4.38 M/UL
RBC # FLD: 13.4 %
RED BLOOD CELLS URINE: 0 /HPF
SODIUM SERPL-SCNC: 137 MMOL/L
SPECIFIC GRAVITY URINE: 1.01
T4 FREE SERPL-MCNC: 1.2 NG/DL
TRIGL SERPL-MCNC: 179 MG/DL
TSH SERPL-ACNC: 4.67 UIU/ML
UROBILINOGEN URINE: 0.2 MG/DL
WBC # FLD AUTO: 7.36 K/UL
WHITE BLOOD CELLS URINE: 1 /HPF

## 2024-09-11 LAB
M TB IFN-G BLD-IMP: NEGATIVE
QUANTIFERON TB PLUS MITOGEN MINUS NIL: >10 IU/ML
QUANTIFERON TB PLUS NIL: 0.16 IU/ML
QUANTIFERON TB PLUS TB1 MINUS NIL: 0 IU/ML
QUANTIFERON TB PLUS TB2 MINUS NIL: 0 IU/ML

## 2024-11-22 ENCOUNTER — APPOINTMENT (OUTPATIENT)
Dept: ULTRASOUND IMAGING | Facility: CLINIC | Age: 44
End: 2024-11-22
Payer: COMMERCIAL

## 2024-11-22 PROCEDURE — 76642 ULTRASOUND BREAST LIMITED: CPT | Mod: 50

## 2025-01-09 ENCOUNTER — APPOINTMENT (OUTPATIENT)
Dept: INTERNAL MEDICINE | Facility: CLINIC | Age: 45
End: 2025-01-09

## 2025-02-03 NOTE — HISTORY OF PRESENT ILLNESS
Physical Therapy Daily Treatment Note    Date: 2/3/2025  Patient Name: Hilaria Castillo  : 1947   MRN: [id number]  DOInjury: -  DOSx: -  Referring Provider:  Abiodun Yuen MD     Medical Diagnosis:   1. Weakness of both lower extremities          X = TO BE PERFORMED NEXT VISIT  > = PROGRESS TO THIS    S: pt reports no new issues.  O: Discussed anatomy, physiology, body mechanics, principles of loading, and progressive loading/activity.   Time 3574-1418     Visit 8/ Repeat outcome measure at mid point and end.    Pain      ROM      Modalities            Exercise      Nustep   L6/ 3 min End of tx. TE         Squats 3 sets to fatigue attempted to go deeper  15,15x,  She had marked difficulty arising from lowest point TA   Calf Raises  15x,2 x 20~ fatigue holding with B hands, 15x finger tip holding on. Holding for 5 sec TE   Toe Raises   TA   Marches Holding bar and raising only ipsilateral leg   on R an L  3 x 20 with no RB TE   Alt. Sidekicks   TA   Leg press 40# 3 x 20    TE   Sit/Stands Unable   TA   LAQ 2d HEP TE   Gait training   GT   Sitting rows See below    Marching Gait   NR   Sidestepping Holding bar and raising only ipsilateral leg   on R an L  TA   steps 4\" RLE 5x,Left LE 5x           Car transfer           Curb training  TA   A:  Tolerated well. Pt was slighty fatigue today.  Difficulty with stepping up on step. Unable to step up on 6\" lizzie[/   P: Continue with rehab plan work on side stepping in order for pt to put walker in the back of suv and walker around to the front of car sidestepping  Sanjana Prado PTA    Treatment Charges: Mins Units   Initial Evaluation     Re-Evaluation     Ther Exercise         TE 30 2   Manual Therapy     MT     Ther Activities        TA 30 2   Gait Training          GT     Neuro Re-education NR     Modalities     Non-Billable Service Time     Other     Total Time/Units 60 4       [de-identified] : Need to switch PCP \par parents and brother my pt \par Works as RN in [employer] \par \par HX hypothyroidism Dx 2000 at age 19yrs - currently on LT4 150mcg 3 days and 175 for 4 days\par -followed by endo \par

## 2025-08-26 ENCOUNTER — APPOINTMENT (OUTPATIENT)
Dept: ULTRASOUND IMAGING | Facility: CLINIC | Age: 45
End: 2025-08-26
Payer: COMMERCIAL

## 2025-08-26 ENCOUNTER — APPOINTMENT (OUTPATIENT)
Dept: MAMMOGRAPHY | Facility: CLINIC | Age: 45
End: 2025-08-26
Payer: COMMERCIAL

## 2025-08-26 PROCEDURE — 77067 SCR MAMMO BI INCL CAD: CPT

## 2025-08-26 PROCEDURE — 76641 ULTRASOUND BREAST COMPLETE: CPT | Mod: 50

## 2025-08-26 PROCEDURE — 77063 BREAST TOMOSYNTHESIS BI: CPT

## 2025-09-15 ENCOUNTER — NON-APPOINTMENT (OUTPATIENT)
Age: 45
End: 2025-09-15

## 2025-09-15 ENCOUNTER — APPOINTMENT (OUTPATIENT)
Dept: INTERNAL MEDICINE | Facility: CLINIC | Age: 45
End: 2025-09-15
Payer: COMMERCIAL

## 2025-09-15 VITALS
SYSTOLIC BLOOD PRESSURE: 104 MMHG | WEIGHT: 152 LBS | BODY MASS INDEX: 24.43 KG/M2 | DIASTOLIC BLOOD PRESSURE: 68 MMHG | OXYGEN SATURATION: 98 % | HEIGHT: 66 IN | TEMPERATURE: 98.3 F | HEART RATE: 75 BPM

## 2025-09-15 DIAGNOSIS — Z11.1 ENCOUNTER FOR SCREENING FOR RESPIRATORY TUBERCULOSIS: ICD-10-CM

## 2025-09-15 DIAGNOSIS — Z00.00 ENCOUNTER FOR GENERAL ADULT MEDICAL EXAMINATION W/OUT ABNORMAL FINDINGS: ICD-10-CM

## 2025-09-15 PROCEDURE — 36415 COLL VENOUS BLD VENIPUNCTURE: CPT

## 2025-09-15 PROCEDURE — 99396 PREV VISIT EST AGE 40-64: CPT | Mod: 25

## 2025-09-15 PROCEDURE — G0008: CPT

## 2025-09-15 PROCEDURE — 90656 IIV3 VACC NO PRSV 0.5 ML IM: CPT

## 2025-09-16 LAB
ALBUMIN SERPL ELPH-MCNC: 4.5 G/DL
ALP BLD-CCNC: 54 U/L
ALT SERPL-CCNC: 25 U/L
ANION GAP SERPL CALC-SCNC: 15 MMOL/L
APPEARANCE: CLEAR
AST SERPL-CCNC: 31 U/L
BILIRUB SERPL-MCNC: 0.3 MG/DL
BILIRUBIN URINE: NEGATIVE
BLOOD URINE: NEGATIVE
BUN SERPL-MCNC: 14 MG/DL
CALCIUM SERPL-MCNC: 9.3 MG/DL
CHLORIDE SERPL-SCNC: 102 MMOL/L
CHOLEST SERPL-MCNC: 191 MG/DL
CO2 SERPL-SCNC: 22 MMOL/L
COLOR: YELLOW
CREAT SERPL-MCNC: 0.78 MG/DL
EGFRCR SERPLBLD CKD-EPI 2021: 96 ML/MIN/1.73M2
ESTIMATED AVERAGE GLUCOSE: 105 MG/DL
GLUCOSE QUALITATIVE U: NEGATIVE MG/DL
GLUCOSE SERPL-MCNC: 77 MG/DL
HBA1C MFR BLD HPLC: 5.3 %
HCT VFR BLD CALC: 39 %
HDLC SERPL-MCNC: 50 MG/DL
HGB BLD-MCNC: 12.7 G/DL
KETONES URINE: NEGATIVE MG/DL
LDLC SERPL-MCNC: 119 MG/DL
LEUKOCYTE ESTERASE URINE: NEGATIVE
MCHC RBC-ENTMCNC: 29 PG
MCHC RBC-ENTMCNC: 32.6 G/DL
MCV RBC AUTO: 89 FL
NITRITE URINE: NEGATIVE
NONHDLC SERPL-MCNC: 141 MG/DL
PH URINE: 6.5
PLATELET # BLD AUTO: 226 K/UL
POTASSIUM SERPL-SCNC: 4.7 MMOL/L
PROT SERPL-MCNC: 6.9 G/DL
PROTEIN URINE: NEGATIVE MG/DL
RBC # BLD: 4.38 M/UL
RBC # FLD: 13.2 %
SODIUM SERPL-SCNC: 138 MMOL/L
SPECIFIC GRAVITY URINE: 1.01
T4 FREE SERPL-MCNC: 1.1 NG/DL
TRIGL SERPL-MCNC: 120 MG/DL
TSH SERPL-ACNC: 3.17 UIU/ML
UROBILINOGEN URINE: 0.2 MG/DL
VIT B12 SERPL-MCNC: 758 PG/ML
WBC # FLD AUTO: 8.89 K/UL

## 2025-09-19 LAB
M TB IFN-G BLD-IMP: NEGATIVE
QUANTIFERON TB PLUS MITOGEN MINUS NIL: 3.16 IU/ML
QUANTIFERON TB PLUS NIL: 0.11 IU/ML
QUANTIFERON TB PLUS TB1 MINUS NIL: 0.01 IU/ML
QUANTIFERON TB PLUS TB2 MINUS NIL: 0.01 IU/ML